# Patient Record
Sex: FEMALE | Race: WHITE | Employment: OTHER | URBAN - METROPOLITAN AREA
[De-identification: names, ages, dates, MRNs, and addresses within clinical notes are randomized per-mention and may not be internally consistent; named-entity substitution may affect disease eponyms.]

---

## 2017-07-21 ENCOUNTER — OFFICE VISIT (OUTPATIENT)
Dept: FAMILY MEDICINE | Facility: CLINIC | Age: 62
End: 2017-07-21
Payer: COMMERCIAL

## 2017-07-21 VITALS
WEIGHT: 120 LBS | DIASTOLIC BLOOD PRESSURE: 81 MMHG | TEMPERATURE: 98.3 F | HEIGHT: 64 IN | OXYGEN SATURATION: 98 % | BODY MASS INDEX: 20.49 KG/M2 | SYSTOLIC BLOOD PRESSURE: 147 MMHG | HEART RATE: 67 BPM

## 2017-07-21 DIAGNOSIS — Z71.89 ADVANCED DIRECTIVES, COUNSELING/DISCUSSION: ICD-10-CM

## 2017-07-21 DIAGNOSIS — N89.8 VAGINAL ITCHING: ICD-10-CM

## 2017-07-21 DIAGNOSIS — N30.01 ACUTE CYSTITIS WITH HEMATURIA: Primary | ICD-10-CM

## 2017-07-21 LAB
ALBUMIN UR-MCNC: NEGATIVE MG/DL
APPEARANCE UR: CLEAR
BACTERIA #/AREA URNS HPF: ABNORMAL /HPF
BILIRUB UR QL STRIP: NEGATIVE
COLOR UR AUTO: YELLOW
GLUCOSE UR STRIP-MCNC: NEGATIVE MG/DL
HGB UR QL STRIP: ABNORMAL
KETONES UR STRIP-MCNC: NEGATIVE MG/DL
LEUKOCYTE ESTERASE UR QL STRIP: ABNORMAL
NITRATE UR QL: POSITIVE
NON-SQ EPI CELLS #/AREA URNS LPF: ABNORMAL /LPF
PH UR STRIP: 5.5 PH (ref 5–7)
RBC #/AREA URNS AUTO: ABNORMAL /HPF (ref 0–2)
SP GR UR STRIP: 1.02 (ref 1–1.03)
URN SPEC COLLECT METH UR: ABNORMAL
UROBILINOGEN UR STRIP-ACNC: 0.2 EU/DL (ref 0.2–1)
WBC #/AREA URNS AUTO: ABNORMAL /HPF (ref 0–2)

## 2017-07-21 PROCEDURE — 81001 URINALYSIS AUTO W/SCOPE: CPT | Performed by: NURSE PRACTITIONER

## 2017-07-21 PROCEDURE — 87088 URINE BACTERIA CULTURE: CPT | Performed by: NURSE PRACTITIONER

## 2017-07-21 PROCEDURE — 87186 SC STD MICRODIL/AGAR DIL: CPT | Performed by: NURSE PRACTITIONER

## 2017-07-21 PROCEDURE — 99213 OFFICE O/P EST LOW 20 MIN: CPT | Performed by: NURSE PRACTITIONER

## 2017-07-21 PROCEDURE — 87086 URINE CULTURE/COLONY COUNT: CPT | Performed by: NURSE PRACTITIONER

## 2017-07-21 RX ORDER — FLUCONAZOLE 150 MG/1
150 TABLET ORAL ONCE
Qty: 1 TABLET | Refills: 0 | Status: SHIPPED | OUTPATIENT
Start: 2017-07-21 | End: 2017-07-21

## 2017-07-21 RX ORDER — SULFAMETHOXAZOLE/TRIMETHOPRIM 800-160 MG
1 TABLET ORAL 2 TIMES DAILY
Qty: 6 TABLET | Refills: 0 | Status: SHIPPED | OUTPATIENT
Start: 2017-07-21 | End: 2017-07-24

## 2017-07-21 NOTE — MR AVS SNAPSHOT
"              After Visit Summary   7/21/2017    Lina Sanford    MRN: [id number]           Patient Information     Date Of Birth          1955        Visit Information        Provider Department      7/21/2017 4:20 PM Regina Amaya NP [facility]        Today's Diagnoses     Urinary symptom or sign    -  1    Advanced directives, counseling/discussion        Nonspecific finding on examination of urine        Acute cystitis with hematuria        Vaginal itching          Care Instructions       * BLADDER INFECTION,Female (Adult)    A bladder infection (\"cystitis\" or \"UTI\") usually causes a constant urge to urinate and a burning when passing urine. Urine may be cloudy, smelly or dark. There may be pain in the lower abdomen. A bladder infection occurs when bacteria from the vaginal area enter the bladder opening (urethra). This can occur from sexual intercourse, wearing tight clothing, dehydration and other factors.  HOME CARE:  1. Drink lots of fluids (at least 6-8 glasses a day, unless you must restrict fluids for other medical reasons). This will force the medicine into your urinary system and flush the bacteria out of your body. Cranberry juice has been shown to help clear out the bacteria.  2. Avoid sexual intercourse until your symptoms are gone.  3. A bladder infection is treated with antibiotics. You may also be given Pyridium (generic = phenazopyridine) to reduce the burning sensation. This medicine will cause your urine to become a bright orange color. The orange urine may stain clothing. You may wear a pad or panty-liner to protect clothing.  PREVENTING FUTURE INFECTIONS:  1. Always wipe from front to back after a bowel movement.  2. Keep the genital area clean and dry.  3. Drink plenty of fluids each day to avoid dehydration.  4. Urinate right after intercourse to flush out the bladder.  5. Wear cotton underwear and cotton-lined panty hose; avoid tight-fitting pants.  6. If you are on " birth control pills and are having frequent bladder infections, discuss with your doctor.  FOLLOW UP: Return to this facility or see your doctor if ALL symptoms are not gone after three days of treatment.  GET PROMPT MEDICAL ATTENTION if any of the following occur:    Fever over 101 F (38.3 C)    No improvement by the third day of treatment    Increasing back or abdominal pain    Repeated vomiting; unable to keep medicine down    Weakness, dizziness or fainting    Vaginal discharge    Pain, redness or swelling in the labia (outer vaginal area)    9359-8896 [employer], [address] [place] [address], [place] [address]. All rights reserved. This information is not intended as a substitute for professional medical care. Always follow your healthcare professional's instructions.    Candida Vaginal Infection    You have a Candida vaginal infection. This is also known as a yeast infection. It is most often caused by a type of yeast (fungus) called Candida. Candida are normally found in the vagina. But if they increase in number, this can lead to infection and cause symptoms.  Symptoms of a yeast infection can include:    Clumpy or thin, white discharge, which may look like cottage cheese    Itching or burning    Burning with urination  Certain factors can make a yeast infection more likely. These can include:    Taking certain medicines, such as antibiotics or birth control pills    Pregnancy    Diabetes    Weakened immune system  A yeast infection is most often treated with antifungal medicine. This may be given as a vaginal cream or pills you take by mouth. Treatment may last for about 1 to 7 days. Women with severe or recurrent infections may need longer courses of treatment.  Home care    If you re prescribed medicine, be sure to use it as directed. Finish all of the medicine, even if your symptoms go away. Note: Don t try to treat yourself using over-the-counter products without talking to your provider first. He  or she will let you know if this is a good option for you.    Ask your provider what steps you can take to help reduce your risk of having a yeast infection in the future.  Follow-up care  Follow up with your healthcare provider, or as directed.  When to seek medical advice  Call your healthcare provider right away if:    You have a fever of 100.4 F (38 C) or higher, or as directed by your provider.    Your symptoms worsen, or they don t go away within a few days of starting treatment.    You have new pain in the lower belly or pelvic region.    You have side effects that bother you or a reaction to the cream or pills you re prescribed.    You or any partners you have sex with have new symptoms, such as a rash, joint pain, or sores.  Date Last Reviewed: 7/30/2015 2000-2017 [employer]. [address] [place] [address]. All rights reserved. This information is not intended as a substitute for professional medical care. Always follow your healthcare professional's instructions.                Follow-ups after your visit        Follow-up notes from your care team     Return if symptoms worsen or fail to improve.      Your next 10 appointments already scheduled     Aug 04, 2017  1:20 PM CDT   PHYSICAL with Amanda Patel,    [facility] ([facility])    [address] [address] [phone]              Who to contact     Normal or non-critical lab and imaging results will be communicated to you by MyChart, letter or phone within 4 business days after the clinic has received the results. If you do not hear from us within 7 days, please contact the clinic through MyChart or phone. If you have a critical or abnormal lab result, we will notify you by phone as soon as possible.  Submit refill requests through [employer] or call your pharmacy and they will forward the refill request to us. Please allow 3 business days for your refill to be  "completed.          If you need to speak with a  for additional information , please call: [phone]             Additional Information About Your Visit        [employer] Information     [employer] gives you secure access to your electronic health record. If you see a primary care provider, you can also send messages to your care team and make appointments. If you have questions, please call your primary care clinic.  If you do not have a primary care provider, please call [phone] and they will assist you.        Care EveryWhere ID     This is your Care EveryWhere ID. This could be used by other organizations to access your [facility] medical records  [id number]        Your Vitals Were     Pulse Temperature Height Last Period Pulse Oximetry BMI (Body Mass Index)    67 98.3  F (36.8  C) (Oral) 5' 4\" (1.626 m) 06/12/2007 98% 20.6 kg/m2       Blood Pressure from Last 3 Encounters:   07/21/17 147/81   06/12/15 122/80   11/22/13 112/74    Weight from Last 3 Encounters:   07/21/17 120 lb (54.4 kg)   06/12/15 115 lb 6.4 oz (52.3 kg)   11/22/13 116 lb 9.6 oz (52.9 kg)              We Performed the Following     UA reflex to Microscopic and Culture     Urine Culture Aerobic Bacterial     Urine Microscopic          Today's Medication Changes          These changes are accurate as of: 7/21/17  4:32 PM.  If you have any questions, ask your nurse or doctor.               Start taking these medicines.        Dose/Directions    fluconazole 150 MG tablet   Commonly known as:  DIFLUCAN   Used for:  Vaginal itching   Started by:  Regina Amaya NP        Dose:  150 mg   Take 1 tablet (150 mg) by mouth once for 1 dose   Quantity:  1 tablet   Refills:  0       sulfamethoxazole-trimethoprim 800-160 MG per tablet   Commonly known as:  BACTRIM DS/SEPTRA DS   Used for:  Acute cystitis with hematuria   Started by:  Regina Amaya NP        Dose:  1 tablet   Take 1 tablet by mouth 2 times daily for 3 days   Quantity:  " 6 tablet   Refills:  0         Stop taking these medicines if you haven't already. Please contact your care team if you have questions.     NO ACTIVE MEDICATIONS   Stopped by:  Regina Amaya, VIET                Where to get your medicines      These medications were sent to [facility] [place] - [address] [facility]  [address] [address] [place] [address]     Phone:  [phone]     fluconazole 150 MG tablet    sulfamethoxazole-trimethoprim 800-160 MG per tablet                Primary Care Provider Office Phone # Fax #    Amanda Wright DO Jorge [phone]       [facility] [address] [place] [facility] [address]        Equal Access to Services     SHRAVAN AYALA : Hadii brandy michelle hadasho Sojuan f, waaxda luqadaha, qaybta kaalmada adeegyada, khadijah joel. So [facility] [phone].    ATENCIÓN: Si habla español, tiene a victoria disposición servicios gratuitos de asistencia lingüística. Llame al [phone].    We comply with applicable federal civil rights laws and [place] laws. We do not discriminate on the basis of race, color, national origin, age, disability sex, sexual orientation or gender identity.            Thank you!     Thank you for choosing [facility]  for your care. Our goal is always to provide you with excellent care. Hearing back from our patients is one way we can continue to improve our services. Please take a few minutes to complete the written survey that you may receive in the mail after your visit with us. Thank you!             Your Updated Medication List - Protect others around you: Learn how to safely use, store and throw away your medicines at www.disposemymeds.org.          This list is accurate as of: 7/21/17  4:32 PM.  Always use your most recent med list.                   Brand Name Dispense Instructions for use Diagnosis    fluconazole 150 MG tablet    DIFLUCAN    1 tablet    Take 1 tablet (150 mg) by mouth once  for 1 dose    Vaginal itching       sulfamethoxazole-trimethoprim 800-160 MG per tablet    BACTRIM DS/SEPTRA DS    6 tablet    Take 1 tablet by mouth 2 times daily for 3 days    Acute cystitis with hematuria

## 2017-07-21 NOTE — PATIENT INSTRUCTIONS
"   * BLADDER INFECTION,Female (Adult)    A bladder infection (\"cystitis\" or \"UTI\") usually causes a constant urge to urinate and a burning when passing urine. Urine may be cloudy, smelly or dark. There may be pain in the lower abdomen. A bladder infection occurs when bacteria from the vaginal area enter the bladder opening (urethra). This can occur from sexual intercourse, wearing tight clothing, dehydration and other factors.  HOME CARE:  1. Drink lots of fluids (at least 6-8 glasses a day, unless you must restrict fluids for other medical reasons). This will force the medicine into your urinary system and flush the bacteria out of your body. Cranberry juice has been shown to help clear out the bacteria.  2. Avoid sexual intercourse until your symptoms are gone.  3. A bladder infection is treated with antibiotics. You may also be given Pyridium (generic = phenazopyridine) to reduce the burning sensation. This medicine will cause your urine to become a bright orange color. The orange urine may stain clothing. You may wear a pad or panty-liner to protect clothing.  PREVENTING FUTURE INFECTIONS:  1. Always wipe from front to back after a bowel movement.  2. Keep the genital area clean and dry.  3. Drink plenty of fluids each day to avoid dehydration.  4. Urinate right after intercourse to flush out the bladder.  5. Wear cotton underwear and cotton-lined panty hose; avoid tight-fitting pants.  6. If you are on birth control pills and are having frequent bladder infections, discuss with your doctor.  FOLLOW UP: Return to this facility or see your doctor if ALL symptoms are not gone after three days of treatment.  GET PROMPT MEDICAL ATTENTION if any of the following occur:    Fever over 101 F (38.3 C)    No improvement by the third day of treatment    Increasing back or abdominal pain    Repeated vomiting; unable to keep medicine down    Weakness, dizziness or fainting    Vaginal discharge    Pain, redness or swelling in " the labia (outer vaginal area)    6340-0487 [employer], [address] [place] [address], [place] [address]. All rights reserved. This information is not intended as a substitute for professional medical care. Always follow your healthcare professional's instructions.    Candida Vaginal Infection    You have a Candida vaginal infection. This is also known as a yeast infection. It is most often caused by a type of yeast (fungus) called Candida. Candida are normally found in the vagina. But if they increase in number, this can lead to infection and cause symptoms.  Symptoms of a yeast infection can include:    Clumpy or thin, white discharge, which may look like cottage cheese    Itching or burning    Burning with urination  Certain factors can make a yeast infection more likely. These can include:    Taking certain medicines, such as antibiotics or birth control pills    Pregnancy    Diabetes    Weakened immune system  A yeast infection is most often treated with antifungal medicine. This may be given as a vaginal cream or pills you take by mouth. Treatment may last for about 1 to 7 days. Women with severe or recurrent infections may need longer courses of treatment.  Home care    If you re prescribed medicine, be sure to use it as directed. Finish all of the medicine, even if your symptoms go away. Note: Don t try to treat yourself using over-the-counter products without talking to your provider first. He or she will let you know if this is a good option for you.    Ask your provider what steps you can take to help reduce your risk of having a yeast infection in the future.  Follow-up care  Follow up with your healthcare provider, or as directed.  When to seek medical advice  Call your healthcare provider right away if:    You have a fever of 100.4 F (38 C) or higher, or as directed by your provider.    Your symptoms worsen, or they don t go away within a few days of starting treatment.    You have new pain in the  lower belly or pelvic region.    You have side effects that bother you or a reaction to the cream or pills you re prescribed.    You or any partners you have sex with have new symptoms, such as a rash, joint pain, or sores.  Date Last Reviewed: 7/30/2015 2000-2017 [employer]. [address] [place] [address], [place] [address]. All rights reserved. This information is not intended as a substitute for professional medical care. Always follow your healthcare professional's instructions.

## 2017-07-21 NOTE — NURSING NOTE
"Chief Complaint   Patient presents with     UTI       Initial /81  Pulse 67  Temp 98.3  F (36.8  C) (Oral)  Ht 5' 4\" (1.626 m)  Wt 120 lb (54.4 kg)  LMP 06/12/2007  SpO2 98%  BMI 20.6 kg/m2 Estimated body mass index is 20.6 kg/(m^2) as calculated from the following:    Height as of this encounter: 5' 4\" (1.626 m).    Weight as of this encounter: 120 lb (54.4 kg).  Medication Reconciliation: complete     Yoanna Amaral MA  "

## 2017-07-21 NOTE — PROGRESS NOTES
SUBJECTIVE:                                                    Lina Sanford is a 61 year old female who presents to clinic today for the following health issues:      Urinary SYMPTOMS     Onset: 2-4 days    Description:   Painful urination (Dysuria): YES  Blood in urine (Hematuria): no   Frequency/Urgency: YES  Abdominal/Pelvic/Flank Pain : YES- pelvic    Other vaginal symptoms: vaginal itching    Progression of Symptoms:  worsening    History:   History of frequent UTI's: no; symptoms similar to prior UTI  History of kidney stones: no   Sexually Active: YES  New Partner: no     Possibility of pregnancy: No     Therapies Tried and outcome: none       Problem list and histories reviewed & adjusted, as indicated.  Additional history: as documented    Patient Active Problem List   Diagnosis     Postmenopausal bleeding     Rotator cuff impingement syndrome     CARDIOVASCULAR SCREENING; LDL GOAL LESS THAN 160     Advanced directives, counseling/discussion     Past Surgical History:   Procedure Laterality Date     C APPENDECTOMY  1963     COLONOSCOPY  1/9/2012    Procedure:COLONOSCOPY; colonoscopy; Surgeon:ANDRIY PETERSON; Location:[facility]     SURGICAL HISTORY OF -   1975    surgical repair of dislocated R middle finger       Social History   Substance Use Topics     Smoking status: Never Smoker     Smokeless tobacco: Never Used     Alcohol use Yes      Comment: occasionally approx 2 drinks per week     Family History   Problem Relation Age of Onset     HEART DISEASE Maternal Grandmother      pacemaker     Thyroid Disease Maternal Grandmother      goiter     C.A.D. Maternal Grandmother      C.A.D. Paternal Grandmother      C.A.D. Paternal Grandfather      Hypertension Paternal Grandfather      CEREBROVASCULAR DISEASE Paternal Grandfather      C.A.D. Sister      at age 50, no MI but strain     DIABETES No family hx of      Breast Cancer No family hx of      Cancer - colorectal No family hx of          Current  "Outpatient Prescriptions   Medication Sig Dispense Refill     sulfamethoxazole-trimethoprim (BACTRIM DS/SEPTRA DS) 800-160 MG per tablet Take 1 tablet by mouth 2 times daily for 3 days 6 tablet 0     fluconazole (DIFLUCAN) 150 MG tablet Take 1 tablet (150 mg) by mouth once for 1 dose 1 tablet 0     Allergies   Allergen Reactions     Nkda [No Known Drug Allergies]      BP Readings from Last 3 Encounters:   07/21/17 147/81   06/12/15 122/80   11/22/13 112/74    Wt Readings from Last 3 Encounters:   07/21/17 120 lb (54.4 kg)   06/12/15 115 lb 6.4 oz (52.3 kg)   11/22/13 116 lb 9.6 oz (52.9 kg)            Labs reviewed in EPIC    Reviewed and updated as needed this visit by clinical staff  Tobacco  Allergies  Meds  Med Hx  Surg Hx  Fam Hx  Soc Hx      Reviewed and updated as needed this visit by Provider         ROS:  Constitutional, HEENT, cardiovascular, pulmonary, GI, , musculoskeletal, neuro, skin, endocrine and psych systems are negative, except as otherwise noted.      OBJECTIVE:   /81  Pulse 67  Temp 98.3  F (36.8  C) (Oral)  Ht 5' 4\" (1.626 m)  Wt 120 lb (54.4 kg)  LMP 06/12/2007  SpO2 98%  BMI 20.6 kg/m2  Body mass index is 20.6 kg/(m^2).  GENERAL: healthy, alert and no distress  RESP: lungs clear to auscultation - no rales, rhonchi or wheezes  CV: regular rate and rhythm, normal S1 S2, no S3 or S4, no murmur, click or rub, no peripheral edema and peripheral pulses strong  ABDOMEN: soft, nontender, no hepatosplenomegaly, no masses and bowel sounds normal  MS: no gross musculoskeletal defects noted, no edema, NO CVA tenderness  SKIN: no suspicious lesions or rashes  PSYCH: mentation appears normal, affect normal/bright    Diagnostic Test Results:  See orders    ASSESSMENT/PLAN:         ICD-10-CM    1. Acute cystitis with hematuria N30.01 UA reflex to Microscopic and Culture     Urine Microscopic     Urine Culture Aerobic Bacterial     sulfamethoxazole-trimethoprim (BACTRIM DS/SEPTRA DS) " 800-160 MG per tablet   2. Advanced directives, counseling/discussion Z71.89    3. Vaginal itching L29.8 fluconazole (DIFLUCAN) 150 MG tablet    suspected yeast infection based on symptoms       See Patient Instructions    Regina Amaya, HEMAL  [facility]

## 2017-07-24 LAB
BACTERIA SPEC CULT: ABNORMAL
MICRO REPORT STATUS: ABNORMAL
MICROORGANISM SPEC CULT: ABNORMAL
SPECIMEN SOURCE: ABNORMAL

## 2017-07-24 RX ORDER — CIPROFLOXACIN 250 MG/1
250 TABLET, FILM COATED ORAL 2 TIMES DAILY
Qty: 6 TABLET | Refills: 0 | Status: SHIPPED | OUTPATIENT
Start: 2017-07-24 | End: 2017-08-04

## 2017-07-24 NOTE — PROGRESS NOTES
Chin Mills,    Thank you for your recent office visit.    Here are your recent results.  Urine culture shows that we should change your antibiotic at this time. Cipro was sent in for you. Please take full course of antibiotics and follow up if symptoms persist or worsen.     Feel free to contact me via [email or web address] or call the clinic at [phone].    Sincerely,    UVALDO Neville, FNP-BC

## 2017-08-04 ENCOUNTER — OFFICE VISIT (OUTPATIENT)
Dept: FAMILY MEDICINE | Facility: CLINIC | Age: 62
End: 2017-08-04
Payer: COMMERCIAL

## 2017-08-04 VITALS
SYSTOLIC BLOOD PRESSURE: 130 MMHG | HEIGHT: 64 IN | HEART RATE: 60 BPM | WEIGHT: 118.6 LBS | BODY MASS INDEX: 20.25 KG/M2 | DIASTOLIC BLOOD PRESSURE: 80 MMHG | TEMPERATURE: 97.5 F

## 2017-08-04 DIAGNOSIS — Z01.419 ENCOUNTER FOR GYNECOLOGICAL EXAMINATION WITHOUT ABNORMAL FINDING: Primary | ICD-10-CM

## 2017-08-04 DIAGNOSIS — Z12.4 SCREENING FOR CERVICAL CANCER: ICD-10-CM

## 2017-08-04 DIAGNOSIS — Z12.31 ENCOUNTER FOR SCREENING MAMMOGRAM FOR BREAST CANCER: ICD-10-CM

## 2017-08-04 DIAGNOSIS — Z11.59 NEED FOR HEPATITIS C SCREENING TEST: ICD-10-CM

## 2017-08-04 DIAGNOSIS — Z13.6 CARDIOVASCULAR SCREENING; LDL GOAL LESS THAN 160: ICD-10-CM

## 2017-08-04 PROCEDURE — 36415 COLL VENOUS BLD VENIPUNCTURE: CPT | Performed by: FAMILY MEDICINE

## 2017-08-04 PROCEDURE — 87624 HPV HI-RISK TYP POOLED RSLT: CPT | Performed by: FAMILY MEDICINE

## 2017-08-04 PROCEDURE — 80061 LIPID PANEL: CPT | Performed by: FAMILY MEDICINE

## 2017-08-04 PROCEDURE — 86803 HEPATITIS C AB TEST: CPT | Performed by: FAMILY MEDICINE

## 2017-08-04 PROCEDURE — G0145 SCR C/V CYTO,THINLAYER,RESCR: HCPCS | Performed by: FAMILY MEDICINE

## 2017-08-04 PROCEDURE — 99396 PREV VISIT EST AGE 40-64: CPT | Performed by: FAMILY MEDICINE

## 2017-08-04 NOTE — LETTER
Lina Sanford  [address] [address]        August 9, 2017          Dear ,    We are writing to inform you of your test results.    Your cholesterol looks good, very stable from last check.  We should continue to check this every few years.    Your hepatitis C screening was negative.      Resulted Orders   Lipid panel reflex to direct LDL   Result Value Ref Range    Cholesterol 235 (H) <200 mg/dL      Comment:      Desirable:       <200 mg/dl    Triglycerides 99 <150 mg/dL    HDL Cholesterol 82 >49 mg/dL    LDL Cholesterol Calculated 133 (H) <100 mg/dL      Comment:      Above desirable:  100-129 mg/dl   Borderline High:  130-159 mg/dL   High:             160-189 mg/dL   Very high:       >189 mg/dl      Non HDL Cholesterol 153 (H) <130 mg/dL      Comment:      Above Desirable:  130-159 mg/dl   Borderline high:  160-189 mg/dl   High:             190-219 mg/dl   Very high:       >219 mg/dl     Hepatitis C Screen Reflex to HCV RNA Quant and Genotype   Result Value Ref Range    Hepatitis C Antibody  NR     Nonreactive   Assay performance characteristics have not been established for newborns,   infants, and children         If you have any questions or concerns, please call the clinic at the number listed above.       Sincerely,        Amanda Patel DO/ag

## 2017-08-04 NOTE — NURSING NOTE
"Initial /80  Pulse 60  Temp 97.5  F (36.4  C) (Tympanic)  Ht 5' 4\" (1.626 m)  Wt 118 lb 9.6 oz (53.8 kg)  LMP 06/12/2007  Breastfeeding? No  BMI 20.36 kg/m2 Estimated body mass index is 20.36 kg/(m^2) as calculated from the following:    Height as of this encounter: 5' 4\" (1.626 m).    Weight as of this encounter: 118 lb 9.6 oz (53.8 kg). .      "

## 2017-08-04 NOTE — PATIENT INSTRUCTIONS
I'd recommend aiming for 1200-1500mg of calcium form all sources daily as well as 1000 units of vitamin D.    You can call [phone] to schedule your mammogram.    We'll let you know your results when available.    Preventive Health Recommendations  Female Ages 50 - 64    Yearly exam: See your health care provider every year in order to  o Review health changes.   o Discuss preventive care.    o Review your medicines if your doctor has prescribed any.      Get a Pap test every three years (unless you have an abnormal result and your provider advises testing more often).    If you get Pap tests with HPV test, you only need to test every 5 years, unless you have an abnormal result.     You do not need a Pap test if your uterus was removed (hysterectomy) and you have not had cancer.    You should be tested each year for STDs (sexually transmitted diseases) if you're at risk.     Have a mammogram every 1 to 2 years.    Have a colonoscopy at age 50, or have a yearly FIT test (stool test). These exams screen for colon cancer.      Have a cholesterol test every 5 years, or more often if advised.    Have a diabetes test (fasting glucose) every three years. If you are at risk for diabetes, you should have this test more often.     If you are at risk for osteoporosis (brittle bone disease), think about having a bone density scan (DEXA).    Shots: Get a flu shot each year. Get a tetanus shot every 10 years.    Nutrition:     Eat at least 5 servings of fruits and vegetables each day.    Eat whole-grain bread, whole-wheat pasta and brown rice instead of white grains and rice.    Talk to your provider about Calcium and Vitamin D.     Lifestyle    Exercise at least 150 minutes a week (30 minutes a day, 5 days a week). This will help you control your weight and prevent disease.    Limit alcohol to one drink per day.    No smoking.     Wear sunscreen to prevent skin cancer.     See your dentist every six months for an exam and  cleaning.    See your eye doctor every 1 to 2 years.

## 2017-08-04 NOTE — PROGRESS NOTES
SUBJECTIVE:   CC: Lina Sanford is an 61 year old woman who presents for preventive health visit.     Answers for HPI/ROS submitted by the patient on 8/4/2017   Annual Exam:  Getting at least 3 servings of Calcium per day:: Yes  Bi-annual eye exam:: Yes  Dental care twice a year:: Yes  Sleep apnea or symptoms of sleep apnea:: None  Diet:: Regular (no restrictions)  Frequency of exercise:: 4-5 days/week  Taking medications regularly:: Not Applicable  Medication side effects:: None  Additional concerns today:: YES  PHQ-2 Score: 0  Duration of exercise:: 45-60 minutes    Concerns:  * blood pressure has been elevated    Today's PHQ-2 Score:   PHQ-2 ( 1999 Pf[employer]) 8/4/2017 11/22/2013   Q1: Little interest or pleasure in doing things 0 0   Q2: Feeling down, depressed or hopeless 0 0   PHQ-2 Score 0 0   Q1: Little interest or pleasure in doing things Not at all -   Q2: Feeling down, depressed or hopeless Not at all -   PHQ-2 Score 0 -       Abuse: Current or Past(Physical, Sexual or Emotional)- No  Do you feel safe in your environment - Yes    Social History   Substance Use Topics     Smoking status: Never Smoker     Smokeless tobacco: Never Used     Alcohol use Yes      Comment: occasionally approx 2 drinks per week     The patient does not drink >3 drinks per day nor >7 drinks per week.    Reviewed orders with patient.  Reviewed health maintenance and updated orders accordingly - Yes    Patient over age 50, mutual decision to screen reflected in health maintenance.      Pertinent mammograms are reviewed under the imaging tab.  History of abnormal Pap smear: NO - age 30- 65 PAP every 3 years recommended    Reviewed and updated as needed this visit by clinical staff  Tobacco  Allergies  Meds  Med Hx  Surg Hx  Fam Hx  Soc Hx        Reviewed and updated as needed this visit by Provider  Tobacco  Med Hx  Surg Hx  Fam Hx  Soc Hx       History reviewed. No pertinent past medical history.   Past Surgical History:  "  Procedure Laterality Date     C APPENDECTOMY  1963     COLONOSCOPY  1/9/2012    Procedure:COLONOSCOPY; colonoscopy; Surgeon:ANDRIY PETERSON; Location:[facility]     SURGICAL HISTORY OF -   1975    surgical repair of dislocated R middle finger       ROS:  C: NEGATIVE for fever, chills, change in weight  I: NEGATIVE for worrisome rashes, moles or lesions  E: NEGATIVE for vision changes or irritation  ENT: NEGATIVE for ear, mouth and throat problems  R: NEGATIVE for significant cough or SOB  B: NEGATIVE for masses, tenderness or discharge  CV: NEGATIVE for chest pain, palpitations or peripheral edema  GI: NEGATIVE for nausea, abdominal pain, heartburn, or change in bowel habits  : NEGATIVE for unusual urinary or vaginal symptoms. No vaginal bleeding.  M: NEGATIVE for significant arthralgias or myalgia  N: NEGATIVE for weakness, dizziness or paresthesias  P: NEGATIVE for changes in mood or affect     OBJECTIVE:   /80  Pulse 60  Temp 97.5  F (36.4  C) (Tympanic)  Ht 5' 4\" (1.626 m)  Wt 118 lb 9.6 oz (53.8 kg)  LMP 06/12/2007  Breastfeeding? No  BMI 20.36 kg/m2  EXAM:  GENERAL APPEARANCE: healthy, alert and no distress  EYES: Eyes grossly normal to inspection, PERRL and conjunctivae and sclerae normal  HENT: ear canals and TM's normal, nose and mouth without ulcers or lesions, oropharynx clear and oral mucous membranes moist  NECK: no adenopathy, no asymmetry, masses, or scars and thyroid normal to palpation  RESP: lungs clear to auscultation - no rales, rhonchi or wheezes  BREAST: normal without masses, tenderness or nipple discharge and no palpable axillary masses or adenopathy  CV: regular rate and rhythm, normal S1 S2, no S3 or S4, no murmur, click or rub, no peripheral edema and peripheral pulses strong  ABDOMEN: soft, nontender, no hepatosplenomegaly, no masses and bowel sounds normal   (female): normal female external genitalia, normal urethral meatus, vaginal mucosal atrophy noted, normal " "cervix, adnexae, and uterus without masses or abnormal discharge  MS: no musculoskeletal defects are noted and gait is age appropriate without ataxia  NEURO: Normal strength and tone, sensory exam grossly normal, mentation intact and speech normal  PSYCH: mentation appears normal and affect normal/bright    ASSESSMENT/PLAN:       ICD-10-CM    1. Encounter for gynecological examination without abnormal finding Z01.419    2. Need for hepatitis C screening test Z11.59 Hepatitis C Screen Reflex to HCV RNA Quant and Genotype   3. CARDIOVASCULAR SCREENING; LDL GOAL LESS THAN 160 Z13.6 Lipid panel reflex to direct LDL   4. Encounter for screening mammogram for breast cancer Z12.31 MA Screening Digital Bilateral   5. Screening for cervical cancer Z12.4 Pap imaged thin layer screen with HPV - recommended age 30 - 65 years (select HPV order below)     HPV High Risk Types DNA Cervical       COUNSELING:   Reviewed preventive health counseling, as reflected in patient instructions  Special attention given to:        Regular exercise       Healthy diet/nutrition       Osteoporosis Prevention/Bone Health       Colon cancer screening       Consider Hep C screening for patients born between 1945 and 1965       (Fanny)menopause management      BP Screening:   Last 3 BP Readings:    BP Readings from Last 3 Encounters:   08/04/17 130/80   07/21/17 147/81   06/12/15 122/80       The following was recommended to the patient:  Re-screen BP within a year and recommended lifestyle modifications   reports that she has never smoked. She has never used smokeless tobacco.    Estimated body mass index is 20.36 kg/(m^2) as calculated from the following:    Height as of this encounter: 5' 4\" (1.626 m).    Weight as of this encounter: 118 lb 9.6 oz (53.8 kg).         Counseling Resources:  ATP IV Guidelines  Pooled Cohorts Equation Calculator  Breast Cancer Risk Calculator  FRAX Risk Assessment  ICSI Preventive Guidelines  Dietary Guidelines for " Americans, 2010  USDA's MyPlate  ASA Prophylaxis  Lung CA Screening    Amanda Patel, DO  [facility]  Patient Instructions   I'd recommend aiming for 1200-1500mg of calcium form all sources daily as well as 1000 units of vitamin D.    You can call [phone] to schedule your mammogram.    We'll let you know your results when available.    Preventive Health Recommendations  Female Ages 50 - 64    Yearly exam: See your health care provider every year in order to  o Review health changes.   o Discuss preventive care.    o Review your medicines if your doctor has prescribed any.      Get a Pap test every three years (unless you have an abnormal result and your provider advises testing more often).    If you get Pap tests with HPV test, you only need to test every 5 years, unless you have an abnormal result.     You do not need a Pap test if your uterus was removed (hysterectomy) and you have not had cancer.    You should be tested each year for STDs (sexually transmitted diseases) if you're at risk.     Have a mammogram every 1 to 2 years.    Have a colonoscopy at age 50, or have a yearly FIT test (stool test). These exams screen for colon cancer.      Have a cholesterol test every 5 years, or more often if advised.    Have a diabetes test (fasting glucose) every three years. If you are at risk for diabetes, you should have this test more often.     If you are at risk for osteoporosis (brittle bone disease), think about having a bone density scan (DEXA).    Shots: Get a flu shot each year. Get a tetanus shot every 10 years.    Nutrition:     Eat at least 5 servings of fruits and vegetables each day.    Eat whole-grain bread, whole-wheat pasta and brown rice instead of white grains and rice.    Talk to your provider about Calcium and Vitamin D.     Lifestyle    Exercise at least 150 minutes a week (30 minutes a day, 5 days a week). This will help you control your weight and prevent disease.    Limit  alcohol to one drink per day.    No smoking.     Wear sunscreen to prevent skin cancer.     See your dentist every six months for an exam and cleaning.    See your eye doctor every 1 to 2 years.

## 2017-08-04 NOTE — MR AVS SNAPSHOT
After Visit Summary   8/4/2017    Lina Sanford    MRN: [id number]           Patient Information     Date Of Birth          1955        Visit Information        Provider Department      8/4/2017 1:20 PM Amanda Patel DO [facility]        Today's Diagnoses     Encounter for gynecological examination without abnormal finding    -  1    Need for hepatitis C screening test        CARDIOVASCULAR SCREENING; LDL GOAL LESS THAN 160        Encounter for screening mammogram for breast cancer        Screening for cervical cancer          Care Instructions    I'd recommend aiming for 1200-1500mg of calcium form all sources daily as well as 1000 units of vitamin D.    You can call [phone] to schedule your mammogram.    We'll let you know your results when available.    Preventive Health Recommendations  Female Ages 50 - 64    Yearly exam: See your health care provider every year in order to  o Review health changes.   o Discuss preventive care.    o Review your medicines if your doctor has prescribed any.      Get a Pap test every three years (unless you have an abnormal result and your provider advises testing more often).    If you get Pap tests with HPV test, you only need to test every 5 years, unless you have an abnormal result.     You do not need a Pap test if your uterus was removed (hysterectomy) and you have not had cancer.    You should be tested each year for STDs (sexually transmitted diseases) if you're at risk.     Have a mammogram every 1 to 2 years.    Have a colonoscopy at age 50, or have a yearly FIT test (stool test). These exams screen for colon cancer.      Have a cholesterol test every 5 years, or more often if advised.    Have a diabetes test (fasting glucose) every three years. If you are at risk for diabetes, you should have this test more often.     If you are at risk for osteoporosis (brittle bone disease), think about having a bone density scan  (DEXA).    Shots: Get a flu shot each year. Get a tetanus shot every 10 years.    Nutrition:     Eat at least 5 servings of fruits and vegetables each day.    Eat whole-grain bread, whole-wheat pasta and brown rice instead of white grains and rice.    Talk to your provider about Calcium and Vitamin D.     Lifestyle    Exercise at least 150 minutes a week (30 minutes a day, 5 days a week). This will help you control your weight and prevent disease.    Limit alcohol to one drink per day.    No smoking.     Wear sunscreen to prevent skin cancer.     See your dentist every six months for an exam and cleaning.    See your eye doctor every 1 to 2 years.            Follow-ups after your visit        Future tests that were ordered for you today     Open Future Orders        Priority Expected Expires Ordered    MA Screening Digital Bilateral Routine  8/4/2018 8/4/2017            Who to contact     Normal or non-critical lab and imaging results will be communicated to you by MyWantshart, letter or phone within 4 business days after the clinic has received the results. If you do not hear from us within 7 days, please contact the clinic through Yugmat or phone. If you have a critical or abnormal lab result, we will notify you by phone as soon as possible.  Submit refill requests through PEMRED or call your pharmacy and they will forward the refill request to us. Please allow 3 business days for your refill to be completed.          If you need to speak with a  for additional information , please call: [phone]           Additional Information About Your Visit        PEMRED Information     PEMRED gives you secure access to your electronic health record. If you see a primary care provider, you can also send messages to your care team and make appointments. If you have questions, please call your primary care clinic.  If you do not have a primary care provider, please call [phone] and they will assist  "you.        Care EveryWhere ID     This is your Care EveryWhere ID. This could be used by other organizations to access your [facility] medical records  [id number]        Your Vitals Were     Pulse Temperature Height Last Period Breastfeeding? BMI (Body Mass Index)    60 97.5  F (36.4  C) (Tympanic) 5' 4\" (1.626 m) 06/12/2007 No 20.36 kg/m2       Blood Pressure from Last 3 Encounters:   08/04/17 130/80   07/21/17 147/81   06/12/15 122/80    Weight from Last 3 Encounters:   08/04/17 118 lb 9.6 oz (53.8 kg)   07/21/17 120 lb (54.4 kg)   06/12/15 115 lb 6.4 oz (52.3 kg)              We Performed the Following     Hepatitis C Screen Reflex to HCV RNA Quant and Genotype     HPV High Risk Types DNA Cervical     Lipid panel reflex to direct LDL     Pap imaged thin layer screen with HPV - recommended age 30 - 65 years (select HPV order below)        Primary Care Provider Office Phone # Fax #    Amanda Adriana Patel [phone]       [facility] [address] [place] [address]        Equal Access to Services     ANA AYALA AH: Hadii brandy sawyer Sojuan f, waaxda luqadaha, qaybta kaalmada adevanyada, khadijah joel. So [facility] [phone].    ATENCIÓN: Si habla español, tiene a victorai disposición servicios gratuitos de asistencia lingüística. LlSelect Medical [facility] [phone].    We comply with applicable federal civil rights laws and [place] laws. We do not discriminate on the basis of race, color, national origin, age, disability sex, sexual orientation or gender identity.            Thank you!     Thank you for choosing [facility]  for your care. Our goal is always to provide you with excellent care. Hearing back from our patients is one way we can continue to improve our services. Please take a few minutes to complete the written survey that you may receive in the mail after your visit with us. Thank you!             Your Updated Medication List - Protect others " around you: Learn how to safely use, store and throw away your medicines at www.disposemymeds.org.      Notice  As of 8/4/2017  2:14 PM    You have not been prescribed any medications.

## 2017-08-05 LAB
CHOLEST SERPL-MCNC: 235 MG/DL
HDLC SERPL-MCNC: 82 MG/DL
LDLC SERPL CALC-MCNC: 133 MG/DL
NONHDLC SERPL-MCNC: 153 MG/DL
TRIGL SERPL-MCNC: 99 MG/DL

## 2017-08-07 LAB — HCV AB SERPL QL IA: NORMAL

## 2017-08-09 LAB
COPATH REPORT: NORMAL
PAP: NORMAL

## 2017-08-10 LAB
FINAL DIAGNOSIS: NORMAL
HPV HR 12 DNA CVX QL NAA+PROBE: NEGATIVE
HPV16 DNA SPEC QL NAA+PROBE: NEGATIVE
HPV18 DNA SPEC QL NAA+PROBE: NEGATIVE
SPECIMEN DESCRIPTION: NORMAL

## 2017-11-10 ENCOUNTER — OFFICE VISIT (OUTPATIENT)
Dept: FAMILY MEDICINE | Facility: CLINIC | Age: 62
End: 2017-11-10
Payer: COMMERCIAL

## 2017-11-10 VITALS
BODY MASS INDEX: 21.1 KG/M2 | DIASTOLIC BLOOD PRESSURE: 80 MMHG | TEMPERATURE: 98.1 F | HEIGHT: 64 IN | SYSTOLIC BLOOD PRESSURE: 124 MMHG | WEIGHT: 123.6 LBS | HEART RATE: 64 BPM

## 2017-11-10 DIAGNOSIS — B02.9 HERPES ZOSTER WITHOUT COMPLICATION: Primary | ICD-10-CM

## 2017-11-10 PROCEDURE — 99213 OFFICE O/P EST LOW 20 MIN: CPT | Performed by: FAMILY MEDICINE

## 2017-11-10 RX ORDER — GABAPENTIN 300 MG/1
CAPSULE ORAL
Qty: 30 CAPSULE | Refills: 0 | Status: SHIPPED | OUTPATIENT
Start: 2017-11-10 | End: 2020-07-30

## 2017-11-10 RX ORDER — VALACYCLOVIR HYDROCHLORIDE 1 G/1
1000 TABLET, FILM COATED ORAL 3 TIMES DAILY
Qty: 21 TABLET | Refills: 0 | Status: SHIPPED | OUTPATIENT
Start: 2017-11-10 | End: 2020-07-30

## 2017-11-10 NOTE — NURSING NOTE
"Initial /80  Pulse 64  Temp 98.1  F (36.7  C) (Tympanic)  Ht 5' 4.25\" (1.632 m)  Wt 123 lb 9.6 oz (56.1 kg)  LMP 06/12/2007  Breastfeeding? No  BMI 21.05 kg/m2 Estimated body mass index is 21.05 kg/(m^2) as calculated from the following:    Height as of this encounter: 5' 4.25\" (1.632 m).    Weight as of this encounter: 123 lb 9.6 oz (56.1 kg). .      "

## 2017-11-10 NOTE — MR AVS SNAPSHOT
After Visit Summary   11/10/2017    Lina Sanford    MRN: [id number]           Patient Information     Date Of Birth          1955        Visit Information        Provider Department      11/10/2017 9:00 AM Amanda Patel DO [facility]        Today's Diagnoses     Herpes zoster without complication    -  1      Care Instructions    I suspect this is shingles for you.  Because of the location I did send a prescription for the antiviral medication for you.  Please make sure to finish the course.    I also printed out a prescription for gabapentin which is a nerve pain medication.  You can begin this if needed and follow instructions for increasing the dose.  This medication can cause drowsiness as the most common side effects so use cautiously.  Tylenol/ibuprofen can also be used as needed for pain.    We scheduled you an appointment with the eye doctor today to make sure there is no eye involvement.  It is very important you be seen before the weekend.    [facility]  [address]  [place] [address]  [phone]            Follow-ups after your visit        Additional Services     OPTOMETRY REFERRAL       Your provider has referred you to: [facility]: [facility] [place] [phone]   [email or web address]  [place] [phone]   [email or web address]    Please be aware that coverage of these services is subject to the terms and limitations of your health insurance plan.  Call member services at your health plan with any benefit or coverage questions.      Please bring the following with you to your appointment:    (1) Any X-Rays, CTs or MRIs which have been performed.  Contact the facility where they were done to arrange for  prior to your scheduled appointment.    (2) List of current medications  (3) This referral request   (4) Any documents/labs given to you for this referral                  Who to contact     Normal  "or non-critical lab and imaging results will be communicated to you by MyChart, letter or phone within 4 business days after the clinic has received the results. If you do not hear from us within 7 days, please contact the clinic through Flexuspinet or phone. If you have a critical or abnormal lab result, we will notify you by phone as soon as possible.  Submit refill requests through [email or web address] or call your pharmacy and they will forward the refill request to us. Please allow 3 business days for your refill to be completed.          If you need to speak with a  for additional information , please call: [phone]           Additional Information About Your Visit        [email or web address] Information     [email or web address] gives you secure access to your electronic health record. If you see a primary care provider, you can also send messages to your care team and make appointments. If you have questions, please call your primary care clinic.  If you do not have a primary care provider, please call [phone] and they will assist you.        Care EveryWhere ID     This is your Care EveryWhere ID. This could be used by other organizations to access your [facility] medical records  [id number]        Your Vitals Were     Pulse Temperature Height Last Period Breastfeeding? BMI (Body Mass Index)    64 98.1  F (36.7  C) (Tympanic) 5' 4.25\" (1.632 m) 06/12/2007 No 21.05 kg/m2       Blood Pressure from Last 3 Encounters:   11/10/17 124/80   08/04/17 130/80   07/21/17 147/81    Weight from Last 3 Encounters:   11/10/17 123 lb 9.6 oz (56.1 kg)   08/04/17 118 lb 9.6 oz (53.8 kg)   07/21/17 120 lb (54.4 kg)              We Performed the Following     OPTOMETRY REFERRAL          Today's Medication Changes          These changes are accurate as of: 11/10/17  9:59 AM.  If you have any questions, ask your nurse or doctor.               Start taking these medicines.        Dose/Directions    gabapentin 300 MG capsule   Commonly known as:  " NEURONTIN   Used for:  Herpes zoster without complication   Started by:  Amanda Patel DO        Take 1 tablet (300 mg) every night for 3 days, then 1 tablet twice daily for 3 days, then 1 tablet three times daily   Quantity:  30 capsule   Refills:  0       valACYclovir 1000 mg tablet   Commonly known as:  VALTREX   Used for:  Herpes zoster without complication   Started by:  Amanda Patel DO        Dose:  1000 mg   Take 1 tablet (1,000 mg) by mouth 3 times daily   Quantity:  21 tablet   Refills:  0            Where to get your medicines      These medications were sent to [facility] [address]  [address] [address]     Phone:  [phone]     valACYclovir 1000 mg tablet         Some of these will need a paper prescription and others can be bought over the counter.  Ask your nurse if you have questions.     Bring a paper prescription for each of these medications     gabapentin 300 MG capsule                Primary Care Provider Office Phone # Fax #    Amanda Patel [phone] [address]   [place] [address]        Equal Access to Services     [facility] CenterJUAN DANIEL : Hadii aad ku hadasho Sojuan f, waaxda luqadaha, qaybta kaalmada adeegyaliu, khadijah joel. So [facility] [phone].    ATENCIÓN: Si habla español, tiene a victoria disposición servicios gratuitos de asistencia lingüística. Llame al [phone].    We comply with applicable federal civil rights laws and [place] laws. We do not discriminate on the basis of race, color, national origin, age, disability, sex, sexual orientation, or gender identity.            Thank you!     Thank you for choosing [facility]  for your care. Our goal is always to provide you with excellent care. Hearing back from our patients is one way we can continue to improve our services. Please take a few minutes to complete the written survey that you  may receive in the mail after your visit with us. Thank you!             Your Updated Medication List - Protect others around you: Learn how to safely use, store and throw away your medicines at www.disposemymeds.org.          This list is accurate as of: 11/10/17  9:59 AM.  Always use your most recent med list.                   Brand Name Dispense Instructions for use Diagnosis    gabapentin 300 MG capsule    NEURONTIN    30 capsule    Take 1 tablet (300 mg) every night for 3 days, then 1 tablet twice daily for 3 days, then 1 tablet three times daily    Herpes zoster without complication       valACYclovir 1000 mg tablet    VALTREX    21 tablet    Take 1 tablet (1,000 mg) by mouth 3 times daily    Herpes zoster without complication

## 2017-11-10 NOTE — PATIENT INSTRUCTIONS
I suspect this is shingles for you.  Because of the location I did send a prescription for the antiviral medication for you.  Please make sure to finish the course.    I also printed out a prescription for gabapentin which is a nerve pain medication.  You can begin this if needed and follow instructions for increasing the dose.  This medication can cause drowsiness as the most common side effects so use cautiously.  Tylenol/ibuprofen can also be used as needed for pain.    We scheduled you an appointment with the eye doctor today to make sure there is no eye involvement.  It is very important you be seen before the weekend.    [facility]  [address], [address]  [place], [place] [id number] [phone]

## 2017-11-10 NOTE — PROGRESS NOTES
"  SUBJECTIVE:   Lina Sanford is a 62 year old female who presents to clinic today for the following health issues:    Rash      Duration: 2 weeks    Description  Location: above left eye  Itching: moderate    Intensity:  mild    Accompanying signs and symptoms: None    History (similar episodes/previous evaluation): has had poison ivy in the past, seems similar     Precipitating or alleviating factors:  New exposures:  None known but she does have poison ivy in her yard  Recent travel: no      Therapies tried and outcome: hydrocortisone cream -  not effective and gold bond is temporarily effective      Errols rash began about 12 days ago. The rash is located at the front end of her left eyebrow, and it appeared like many little blisters. The blisters have since crusted. The rash is not getting worse but is not getting better. She has associated itch in left upper scalp, left forehead, left eyebrow, and in the last few days has felt itchiness around her left eye. She reports its become slightly painful under her left eye. She has tried hydrocortisone cream and menthol cream which provided only short lived relief.   She thought this was poison ivy since she has had poison ivy problems in her yard in the past. However, this time she is experiencing itch where there is no rash. She denies tingling, numbness, or pain. She denies vision changes. She has had chickenpox before.       Problem list and histories reviewed & adjusted, as indicated.  Additional history: as documented    Reviewed and updated as needed this visit by clinical staffTobacco  Allergies  Meds  Med Hx  Surg Hx  Fam Hx  Soc Hx      Reviewed and updated as needed this visit by Provider  Tobacco  Med Hx  Surg Hx  Fam Hx  Soc Hx        ROS:  Constitutional, HEENT, cardiovascular, pulmonary, gi and gu systems are negative, except as otherwise noted.      OBJECTIVE:   /80  Pulse 64  Temp 98.1  F (36.7  C) (Tympanic)  Ht 5' 4.25\" (1.632 " m)  Wt 123 lb 9.6 oz (56.1 kg)  LMP 06/12/2007  Breastfeeding? No  BMI 21.05 kg/m2  Body mass index is 21.05 kg/(m^2).  GENERAL: healthy, alert and no distress  NECK: no adenopathy, no asymmetry, masses, or scars and thyroid normal to palpation  RESP: lungs clear to auscultation - no rales, rhonchi or wheezes  CV: regular rate and rhythm, normal S1 S2, no S3 or S4, no murmur, click or rub, no peripheral edema and peripheral pulses strong  MS: no gross musculoskeletal defects noted, no edema  SKIN: Erythematous vesicular lesion in the front end of her left eyebrow which has crusted. Left scalp appears irritated but without a vesicular rash.    ASSESSMENT/PLAN:     Lina is a 61 yo woman who presents with vesicular rash and itchiness along her left upper face and scalp. This is unlikely to be poison ivy and most likely to be Shingles due to the dermatomal distribution as well as itch where there is no rash. Due to concern of proximity to eye, suggest optometry referral to see today.       ICD-10-CM    1. Herpes zoster without complication B02.9 OPTOMETRY REFERRAL     valACYclovir (VALTREX) 1000 mg tablet     gabapentin (NEURONTIN) 300 MG capsule     Patient Instructions   I suspect this is shingles for you.  Because of the location I did send a prescription for the antiviral medication for you.  Please make sure to finish the course.    I also printed out a prescription for gabapentin which is a nerve pain medication.  You can begin this if needed and follow instructions for increasing the dose.  This medication can cause drowsiness as the most common side effects so use cautiously.  Tylenol/ibuprofen can also be used as needed for pain.    We scheduled you an appointment with the eye doctor today to make sure there is no eye involvement.  It is very important you be seen before the weekend.    [facility]  [address], [address]  [place] [address] [phone]          Amanda Patel,  DO  [facility] [address]

## 2018-03-02 ENCOUNTER — HOSPITAL ENCOUNTER (EMERGENCY)
Facility: CLINIC | Age: 63
Discharge: HOME | End: 2018-03-02
Attending: FAMILY MEDICINE | Admitting: FAMILY MEDICINE
Payer: COMMERCIAL

## 2018-03-02 ENCOUNTER — APPOINTMENT (OUTPATIENT)
Dept: CT IMAGING | Facility: CLINIC | Age: 63
End: 2018-03-02
Attending: FAMILY MEDICINE
Payer: COMMERCIAL

## 2018-03-02 VITALS
OXYGEN SATURATION: 97 % | HEIGHT: 64 IN | SYSTOLIC BLOOD PRESSURE: 132 MMHG | WEIGHT: 120 LBS | RESPIRATION RATE: 22 BRPM | TEMPERATURE: 97 F | DIASTOLIC BLOOD PRESSURE: 90 MMHG | BODY MASS INDEX: 20.49 KG/M2 | HEART RATE: 76 BPM

## 2018-03-02 DIAGNOSIS — G51.0 BELL'S PALSY: ICD-10-CM

## 2018-03-02 LAB
ALBUMIN SERPL-MCNC: 3.6 G/DL (ref 3.4–5)
ALP SERPL-CCNC: 99 U/L (ref 40–150)
ALT SERPL W P-5'-P-CCNC: 14 U/L (ref 0–50)
ANION GAP SERPL CALCULATED.3IONS-SCNC: 6 MMOL/L (ref 3–14)
APTT PPP: 30 SEC (ref 22–37)
AST SERPL W P-5'-P-CCNC: 13 U/L (ref 0–45)
B BURGDOR IGG+IGM SER QL: <0.01 (ref 0–0.89)
BASOPHILS # BLD AUTO: 0 10E9/L (ref 0–0.2)
BASOPHILS NFR BLD AUTO: 0.1 %
BILIRUB SERPL-MCNC: 0.5 MG/DL (ref 0.2–1.3)
BUN SERPL-MCNC: 14 MG/DL (ref 7–30)
CALCIUM SERPL-MCNC: 8.7 MG/DL (ref 8.5–10.1)
CHLORIDE SERPL-SCNC: 104 MMOL/L (ref 94–109)
CO2 SERPL-SCNC: 28 MMOL/L (ref 20–32)
CREAT SERPL-MCNC: 0.58 MG/DL (ref 0.52–1.04)
DIFFERENTIAL METHOD BLD: NORMAL
EOSINOPHIL # BLD AUTO: 0.1 10E9/L (ref 0–0.7)
EOSINOPHIL NFR BLD AUTO: 1.6 %
ERYTHROCYTE [DISTWIDTH] IN BLOOD BY AUTOMATED COUNT: 12.3 % (ref 10–15)
GFR SERPL CREATININE-BSD FRML MDRD: >90 ML/MIN/1.7M2
GLUCOSE BLDC GLUCOMTR-MCNC: 95 MG/DL (ref 70–99)
GLUCOSE SERPL-MCNC: 93 MG/DL (ref 70–99)
HCT VFR BLD AUTO: 42.6 % (ref 35–47)
HGB BLD-MCNC: 14.3 G/DL (ref 11.7–15.7)
IMM GRANULOCYTES # BLD: 0 10E9/L (ref 0–0.4)
IMM GRANULOCYTES NFR BLD: 0.4 %
INR PPP: 0.89 (ref 0.86–1.14)
LYMPHOCYTES # BLD AUTO: 1.8 10E9/L (ref 0.8–5.3)
LYMPHOCYTES NFR BLD AUTO: 26.4 %
MCH RBC QN AUTO: 29.8 PG (ref 26.5–33)
MCHC RBC AUTO-ENTMCNC: 33.6 G/DL (ref 31.5–36.5)
MCV RBC AUTO: 89 FL (ref 78–100)
MONOCYTES # BLD AUTO: 0.7 10E9/L (ref 0–1.3)
MONOCYTES NFR BLD AUTO: 10.4 %
NEUTROPHILS # BLD AUTO: 4.2 10E9/L (ref 1.6–8.3)
NEUTROPHILS NFR BLD AUTO: 61.1 %
PLATELET # BLD AUTO: 259 10E9/L (ref 150–450)
POTASSIUM SERPL-SCNC: 3.9 MMOL/L (ref 3.4–5.3)
PROT SERPL-MCNC: 7.5 G/DL (ref 6.8–8.8)
RBC # BLD AUTO: 4.8 10E12/L (ref 3.8–5.2)
SODIUM SERPL-SCNC: 138 MMOL/L (ref 133–144)
TROPONIN I SERPL-MCNC: <0.015 UG/L (ref 0–0.04)
WBC # BLD AUTO: 6.9 10E9/L (ref 4–11)

## 2018-03-02 PROCEDURE — 70498 CT ANGIOGRAPHY NECK: CPT

## 2018-03-02 PROCEDURE — 99285 EMERGENCY DEPT VISIT HI MDM: CPT | Mod: 25 | Performed by: FAMILY MEDICINE

## 2018-03-02 PROCEDURE — 93005 ELECTROCARDIOGRAM TRACING: CPT | Performed by: FAMILY MEDICINE

## 2018-03-02 PROCEDURE — 00000146 ZZHCL STATISTIC GLUCOSE BY METER IP

## 2018-03-02 PROCEDURE — 85025 COMPLETE CBC W/AUTO DIFF WBC: CPT | Performed by: FAMILY MEDICINE

## 2018-03-02 PROCEDURE — 80053 COMPREHEN METABOLIC PANEL: CPT | Performed by: FAMILY MEDICINE

## 2018-03-02 PROCEDURE — 93010 ELECTROCARDIOGRAM REPORT: CPT | Mod: Z6 | Performed by: FAMILY MEDICINE

## 2018-03-02 PROCEDURE — 96360 HYDRATION IV INFUSION INIT: CPT | Mod: 59 | Performed by: FAMILY MEDICINE

## 2018-03-02 PROCEDURE — 25000128 H RX IP 250 OP 636: Performed by: FAMILY MEDICINE

## 2018-03-02 PROCEDURE — 70450 CT HEAD/BRAIN W/O DYE: CPT

## 2018-03-02 PROCEDURE — 25000125 ZZHC RX 250: Performed by: FAMILY MEDICINE

## 2018-03-02 PROCEDURE — 84484 ASSAY OF TROPONIN QUANT: CPT | Performed by: FAMILY MEDICINE

## 2018-03-02 PROCEDURE — 86618 LYME DISEASE ANTIBODY: CPT | Performed by: FAMILY MEDICINE

## 2018-03-02 RX ORDER — PREDNISONE 20 MG/1
80 TABLET ORAL DAILY
Qty: 28 TABLET | Refills: 0 | Status: SHIPPED | OUTPATIENT
Start: 2018-03-02 | End: 2018-03-09

## 2018-03-02 RX ORDER — VALACYCLOVIR HYDROCHLORIDE 1 G/1
1000 TABLET, FILM COATED ORAL 3 TIMES DAILY
Qty: 21 TABLET | Refills: 0 | Status: SHIPPED | OUTPATIENT
Start: 2018-03-02 | End: 2020-07-30

## 2018-03-02 RX ORDER — IOPAMIDOL 755 MG/ML
70 INJECTION, SOLUTION INTRAVASCULAR ONCE
Status: COMPLETED | OUTPATIENT
Start: 2018-03-02 | End: 2018-03-02

## 2018-03-02 RX ADMIN — SODIUM CHLORIDE 100 ML: 9 INJECTION, SOLUTION INTRAVENOUS at 07:23

## 2018-03-02 RX ADMIN — IOPAMIDOL 70 ML: 755 INJECTION, SOLUTION INTRAVENOUS at 07:23

## 2018-03-02 RX ADMIN — SODIUM CHLORIDE 500 ML: 9 INJECTION, SOLUTION INTRAVENOUS at 07:42

## 2018-03-02 NOTE — DISCHARGE INSTRUCTIONS
Caldera s Palsy    Bell's Palsy is a problem involving the nerve that controls the muscles on one side of the face.  The cause is unknown, but may be related to inflammation of the nerve. Symptoms usually appear only on the affected side. They may include:    Inability to close the eyelid    Tearing of the eye    Facial drooping    Drooling    Numbness or pain    Changes in taste    Sound sensitivity  Damage to the eye can be a serious problem. The inability to blink can cause the eye to dry out. An ulcer (sore) can then form on the cornea. Also, not blinking means that the eye has no protection from dirt and dust particles.  Treatment involves protecting and moistening the eye. Medicines may also help.  Most persons recover completely within 3 to 6 months. However, the condition sometimes returns months or years later.  Home care    Get plenty of rest and eat a healthy diet to help yourself recover.    Use Artificial Tears frequently during the day and at bedtime to prevent drying. These drops are available without prescription at your drug store.    Wear protective glasses especially when outside to protect from flying debris. Use sunglasses when outdoors.    Tape the eyelid closed at bedtime with a paper tape (available at your pharmacy). This has a very mild adhesive to avoid injury to the lid. This will protect your eye from injury while you sleep.    Sometimes medicines are prescribed to reduce inflammation or treat specific viral infections of the nerve. If medicines are prescribed, take them exactly as directed. Usually there is a 10-day course of medication that is started as soon as possible. Taking this medicine as prescribed will help with a full recovery.    Use low heat, for example from a heating pad, on the affected area. This can help reduce pain and swelling.    If you are experiencing sever pain, contact your health care provider.  Follow-up care  Follow up with your healthcare provider as advised.  If you referred to a specialist, make that appointment promptly.  When to seek medical advice  Call your healthcare provider if any of the following occur:    Severe eye redness    Eye pain    Thick drainage from the eye    Change in vision (such as double vision or losing vision)    Fever over 100.4 F (38 C) or as directed by your healthcare provider    Headache, neck pain, weakness, trouble speaking or walking, or other unexplained symptoms  Date Last Reviewed: 8/23/2015 2000-2017 [employer]. [address] [place] [address]. All rights reserved. This information is not intended as a substitute for professional medical care. Always follow your healthcare professional's instructions.    Prednisone 80 mg once daily times a week.  Valtrex 1 g 3 times a day ×1 week.  Recheck in her physician's office in 2-3 days.  Return to the emergency department if worse or changes.

## 2018-03-02 NOTE — ED PROVIDER NOTES
History     Chief Complaint   Patient presents with     Numbness     to right lower lip     HPI  Lina Sanford is a 62 year old female, past medical history significant for rotator cuff impingement syndrome, postmenopausal bleeding, history of herpes zoster, presents to the emergency department with concerns of numbness to her right lower lip.  History is obtained from the patient states that she felt fine other than a moderate all over headache which was dull and aching when she went to bed last night.  When she woke this morning the angle of her right mouth area felt somewhat numb and tingly, the headache was gone, and she noticed that the right side of her lower face was not moving properly.  Especially her smile.  Her  notices 2.  She had no difficulties communicating, no dysarthria, no expressive aphasia by history, no weakness of arms or legs or paresthesias of arms or legs.  There is no nausea or vomiting.  No recent illness.  Patient has never had symptoms like this before.    Problem List:    Patient Active Problem List    Diagnosis Date Noted     Advanced directives, counseling/discussion 07/21/2017     Priority: Medium     Advance Care Planning 7/21/2017: ACP Review of Chart / Resources Provided:  Reviewed chart for advance care plan.  Lina Sanford has been provided information and resources to begin or update their advance care plan.  Added by Yoanna Amaral            CARDIOVASCULAR SCREENING; LDL GOAL LESS THAN 160 10/31/2010     Priority: Medium     Rotator cuff impingement syndrome 05/12/2009     Priority: Medium     Postmenopausal bleeding 05/08/2009     Priority: Medium        Past Medical History:    No past medical history on file.    Past Surgical History:    Past Surgical History:   Procedure Laterality Date     C APPENDECTOMY  1963     COLONOSCOPY  1/9/2012    Procedure:COLONOSCOPY; colonoscopy; Surgeon:ANDRIY PETERSON; Location:[facility]     SURGICAL HISTORY OF -   1975     "surgical repair of dislocated R middle finger       Family History:    Family History   Problem Relation Age of Onset     HEART DISEASE Maternal Grandmother      pacemaker     Thyroid Disease Maternal Grandmother      goiter     C.A.D. Maternal Grandmother      C.A.D. Paternal Grandmother      C.A.D. Paternal Grandfather      Hypertension Paternal Grandfather      CEREBROVASCULAR DISEASE Paternal Grandfather      C.A.D. Sister      at age 50, no MI but strain     DIABETES No family hx of      Breast Cancer No family hx of      Cancer - colorectal No family hx of        Social History:  Marital Status:   [2]  Social History   Substance Use Topics     Smoking status: Never Smoker     Smokeless tobacco: Never Used     Alcohol use Yes      Comment: occasionally approx 2 drinks per week        Medications:      predniSONE (DELTASONE) 20 MG tablet   valACYclovir (VALTREX) 1000 mg tablet   valACYclovir (VALTREX) 1000 mg tablet   gabapentin (NEURONTIN) 300 MG capsule         Review of Systems   All other systems reviewed and are negative.      Physical Exam   BP: (!) 153/93 (Simultaneous filing. User may not have seen previous data.)  Pulse: 76  Heart Rate: 71  Temp: 97  F (36.1  C)  Resp: 18  Height: 162.6 cm (5' 4\")  Weight: 54.4 kg (120 lb)  SpO2: 98 % (Simultaneous filing. User may not have seen previous data.)      Physical Exam   Constitutional: She is oriented to person, place, and time. She appears well-developed and well-nourished.   HENT:   Head: Normocephalic and atraumatic.   Right Ear: External ear normal.   Left Ear: External ear normal.   Nose: Nose normal.   Mouth/Throat: Oropharynx is clear and moist.   Subtle facial asymmetry with the right angle of mouth not moving compared to the left side and loss of wrinkling on the right lower face.  The forehead move symmetrically as do the eyelids.   Eyes: Conjunctivae and EOM are normal. Pupils are equal, round, and reactive to light.   Neck: Normal range of " motion. Neck supple.   Cardiovascular: Normal rate, regular rhythm, normal heart sounds and intact distal pulses.    Pulmonary/Chest: Effort normal.   Abdominal: Soft. Bowel sounds are normal.   Musculoskeletal: Normal range of motion.   Neurological: She is alert and oriented to person, place, and time.   Skin: Skin is warm and dry.   Psychiatric: She has a normal mood and affect. Her behavior is normal.   Nursing note and vitals reviewed.      ED Course     ED Course     Procedures               EKG Interpretation:      Interpreted by Parrish Brooks  Time reviewed: 06:58  Sinus rhythm 70 bpm no acute ST-T wave changes.  Normal axis normal intervals.  Normal EKG.    Results for orders placed or performed during the hospital encounter of 03/02/18   CT Head w/o Contrast    Narrative    CT SCAN OF THE HEAD WITHOUT CONTRAST   3/2/2018 7:34 AM     HISTORY: Right-sided facial weakness, headache.     TECHNIQUE:  Axial images of the head and coronal reformations without  IV contrast material.  Radiation dose for this scan was reduced using  automated exposure control, adjustment of the mA and/or kV according  to patient size, or iterative reconstruction technique.    COMPARISON: None.    FINDINGS:  The ventricles are normal in size, shape and configuration.   The brain parenchyma and subarachnoid spaces are normal. There is no  evidence of intracranial hemorrhage, mass, acute infarct or anomaly.     The visualized portions of the sinuses and mastoids appear normal.  There is no evidence of trauma.      Impression    IMPRESSION:  Normal CT scan of the head.     DASH MITTAL MD   CT Head Neck Angio w/o & w Contrast    Narrative    CT ANGIOGRAM OF THE HEAD AND NECK WITHOUT AND WITH CONTRAST  3/2/2018  7:41 AM     COMPARISON: None.    HISTORY: Right-sided facial weakness, headache.    TECHNIQUE:  Precontrast localizing scans were followed by CT  angiography with an injection of 70 mL Isovue 370 nonionic  intravenous  contrast material with scans through the head and neck.  Images were  transferred to a separate 3-D workstation where multiplanar  reformations and 3-D images were created.  Estimates of carotid  stenoses are made relative to the distal internal carotid artery  diameters except as noted.      FINDINGS:   Neck CTA: The common carotid arteries bilaterally are patent without  stenosis. The cervical internal carotid arteries bilaterally are  patent without stenosis. The dominant left and small right vertebral  arteries are patent and unremarkable.    Head CTA: The basilar, bilateral distal internal carotid, bilateral  anterior cerebral, bilateral middle cerebral and bilateral posterior  cerebral arteries are patent and unremarkable. The anterior  communicating and bilateral posterior communicating arteries are  patent and unremarkable.      Impression    IMPRESSION: Normal neck and head CTA.      Radiation dose for this scan was reduced using automated exposure  control, adjustment of the mA and/or kV according to patient size, or  iterative reconstruction technique.                 Critical Care time:  none               Labs Ordered and Resulted from Time of ED Arrival Up to the Time of Departure from the ED   CBC WITH PLATELETS DIFFERENTIAL   COMPREHENSIVE METABOLIC PANEL   TROPONIN I   GLUCOSE BY METER   INR   PARTIAL THROMBOPLASTIN TIME   PERIPHERAL IV CATHETER   GLUCOSE MONITOR NURSING POCT   VITAL SIGNS AND NEURO CHECKS   ACTIVITY   PULSE OXIMETRY NURSING   GLUCOSE MONITOR NURSING POCT   ASSESSMENT     Results for orders placed or performed during the hospital encounter of 03/02/18   CT Head w/o Contrast    Narrative    CT SCAN OF THE HEAD WITHOUT CONTRAST   3/2/2018 7:34 AM     HISTORY: Right-sided facial weakness, headache.     TECHNIQUE:  Axial images of the head and coronal reformations without  IV contrast material.  Radiation dose for this scan was reduced using  automated exposure control,  adjustment of the mA and/or kV according  to patient size, or iterative reconstruction technique.    COMPARISON: None.    FINDINGS:  The ventricles are normal in size, shape and configuration.   The brain parenchyma and subarachnoid spaces are normal. There is no  evidence of intracranial hemorrhage, mass, acute infarct or anomaly.     The visualized portions of the sinuses and mastoids appear normal.  There is no evidence of trauma.      Impression    IMPRESSION:  Normal CT scan of the head.     DASH MITTAL MD   CT Head Neck Angio w/o & w Contrast    Narrative    CT ANGIOGRAM OF THE HEAD AND NECK WITHOUT AND WITH CONTRAST  3/2/2018  7:41 AM     COMPARISON: None.    HISTORY: Right-sided facial weakness, headache.    TECHNIQUE:  Precontrast localizing scans were followed by CT  angiography with an injection of 70 mL Isovue 370 nonionic intravenous  contrast material with scans through the head and neck.  Images were  transferred to a separate 3-D workstation where multiplanar  reformations and 3-D images were created.  Estimates of carotid  stenoses are made relative to the distal internal carotid artery  diameters except as noted.      FINDINGS:   Neck CTA: The common carotid arteries bilaterally are patent without  stenosis. The cervical internal carotid arteries bilaterally are  patent without stenosis. The dominant left and small right vertebral  arteries are patent and unremarkable.    Head CTA: The basilar, bilateral distal internal carotid, bilateral  anterior cerebral, bilateral middle cerebral and bilateral posterior  cerebral arteries are patent and unremarkable. The anterior  communicating and bilateral posterior communicating arteries are  patent and unremarkable.      Impression    IMPRESSION: Normal neck and head CTA.      Radiation dose for this scan was reduced using automated exposure  control, adjustment of the mA and/or kV according to patient size, or  iterative reconstruction technique.     8:34  AM  Lab diagnostics resulted during the emergency department visit and imaging studies are reviewed with the patient I answered her questions.    Assessments & Plan (with Medical Decision Making)   62-year-old female past medical history reviewed as above, presents the emergency department with right lower lip numbness.  Physical exam reveals a facial asymmetry involving the lower right face only.  Otherwise normal symmetric exam without neurologic asymmetry specifically.  Differential diagnosis was discussed with the patient imaging studies of her head and neck obtained.  No acute findings.  Lab diagnostics also reviewed above.  Proposed treatment with prednisone and Valtrex was discussed with the patient as well as interval follow-up in 2-3 days in clinic with her primary care provider.  Return to the emergency department if worse or changes in the interim.      Disclaimer: This note consists of symbols derived from keyboarding, dictation and/or voice recognition software. As a result, there may be errors in the script that have gone undetected. Please consider this when interpreting information found in this chart.      I have reviewed the nursing notes.    I have reviewed the findings, diagnosis, plan and need for follow up with the patient.       Discharge Medication List as of 3/2/2018  8:38 AM      START taking these medications    Details   predniSONE (DELTASONE) 20 MG tablet Take 4 tablets (80 mg) by mouth daily for 7 days, Disp-28 tablet, R-0, E-Prescribe      !! valACYclovir (VALTREX) 1000 mg tablet Take 1 tablet (1,000 mg) by mouth 3 times daily for 7 days, Disp-21 tablet, R-0, E-Prescribe       !! - Potential duplicate medications found. Please discuss with provider.          Final diagnoses:   Bell's palsy       3/2/2018   [facility] EMERGENCY DEPARTMENT     Parrish Brooks MD  03/02/18 0835       Parrish Brooks MD  03/07/18 0058

## 2018-03-02 NOTE — ED AVS SNAPSHOT
[facility] Emergency Department    [address] [place] [address]    Phone:  [phone]    Fax:  [phone]                                       Lina Sanford   MRN: [id number]    Department:  [facility] Emergency Department   Date of Visit:  3/2/2018           Patient Information     Date Of Birth          1955        Your diagnoses for this visit were:     Bell's palsy        You were seen by Parrish Brooks MD.      Follow-up Information     Follow up with Amanda Patel DO. Schedule an appointment as soon as possible for a visit in 2 days.    Specialties:  Family Practice, Urgent Care    Contact information:    [address] [place] [address]  [phone]          Discharge Instructions         Caldera s Palsy    Bell's Palsy is a problem involving the nerve that controls the muscles on one side of the face.  The cause is unknown, but may be related to inflammation of the nerve. Symptoms usually appear only on the affected side. They may include:    Inability to close the eyelid    Tearing of the eye    Facial drooping    Drooling    Numbness or pain    Changes in taste    Sound sensitivity  Damage to the eye can be a serious problem. The inability to blink can cause the eye to dry out. An ulcer (sore) can then form on the cornea. Also, not blinking means that the eye has no protection from dirt and dust particles.  Treatment involves protecting and moistening the eye. Medicines may also help.  Most persons recover completely within 3 to 6 months. However, the condition sometimes returns months or years later.  Home care    Get plenty of rest and eat a healthy diet to help yourself recover.    Use Artificial Tears frequently during the day and at bedtime to prevent drying. These drops are available without prescription at your drug store.    Wear protective glasses especially when outside to protect from flying debris. Use sunglasses when outdoors.    Tape the  eyelid closed at bedtime with a paper tape (available at your pharmacy). This has a very mild adhesive to avoid injury to the lid. This will protect your eye from injury while you sleep.    Sometimes medicines are prescribed to reduce inflammation or treat specific viral infections of the nerve. If medicines are prescribed, take them exactly as directed. Usually there is a 10-day course of medication that is started as soon as possible. Taking this medicine as prescribed will help with a full recovery.    Use low heat, for example from a heating pad, on the affected area. This can help reduce pain and swelling.    If you are experiencing sever pain, contact your health care provider.  Follow-up care  Follow up with your healthcare provider as advised. If you referred to a specialist, make that appointment promptly.  When to seek medical advice  Call your healthcare provider if any of the following occur:    Severe eye redness    Eye pain    Thick drainage from the eye    Change in vision (such as double vision or losing vision)    Fever over 100.4 F (38 C) or as directed by your healthcare provider    Headache, neck pain, weakness, trouble speaking or walking, or other unexplained symptoms  Date Last Reviewed: 8/23/2015 2000-2017 [employer]. [address] [place] [address]. All rights reserved. This information is not intended as a substitute for professional medical care. Always follow your healthcare professional's instructions.    Prednisone 80 mg once daily times a week.  Valtrex 1 g 3 times a day ×1 week.  Recheck in her physician's office in 2-3 days.  Return to the emergency department if worse or changes.      24 Hour Appointment Hotline       To make an appointment at any [facility] clinic, call [phone] ([phone]). If you don't have a family doctor or clinic, we will help you find one. [facility] clinics are conveniently located to serve the needs of you and your  family.             Review of your medicines      START taking        Dose / Directions Last dose taken    predniSONE 20 MG tablet   Commonly known as:  DELTASONE   Dose:  80 mg   Quantity:  28 tablet        Take 4 tablets (80 mg) by mouth daily for 7 days   Refills:  0          CONTINUE these medicines which may have CHANGED, or have new prescriptions. If we are uncertain of the size of tablets/capsules you have at home, strength may be listed as something that might have changed.        Dose / Directions Last dose taken    * valACYclovir 1000 mg tablet   Commonly known as:  VALTREX   Dose:  1000 mg   What changed:  Another medication with the same name was added. Make sure you understand how and when to take each.   Quantity:  21 tablet        Take 1 tablet (1,000 mg) by mouth 3 times daily   Refills:  0        * valACYclovir 1000 mg tablet   Commonly known as:  VALTREX   Dose:  1000 mg   What changed:  You were already taking a medication with the same name, and this prescription was added. Make sure you understand how and when to take each.   Quantity:  21 tablet        Take 1 tablet (1,000 mg) by mouth 3 times daily for 7 days   Refills:  0        * Notice:  This list has 2 medication(s) that are the same as other medications prescribed for you. Read the directions carefully, and ask your doctor or other care provider to review them with you.      Our records show that you are taking the medicines listed below. If these are incorrect, please call your family doctor or clinic.        Dose / Directions Last dose taken    gabapentin 300 MG capsule   Commonly known as:  NEURONTIN   Quantity:  30 capsule        Take 1 tablet (300 mg) every night for 3 days, then 1 tablet twice daily for 3 days, then 1 tablet three times daily   Refills:  0                Prescriptions were sent or printed at these locations (2 Prescriptions)                   [facility] [place], [place] - [address] [address]    Telephone:  [phone]   Fax:  [phone]   Hours:                  E-Prescribed (2 of 2)         predniSONE (DELTASONE) 20 MG tablet               valACYclovir (VALTREX) 1000 mg tablet                Procedures and tests performed during your visit     Procedure/Test Number of Times Performed    Activity: Bedrest 1    CBC with platelets differential 1    CT Head Neck Angio w/o & w Contrast 1    CT Head w/o Contrast 1    Comprehensive metabolic panel 1    Dysphagia Screen 1    EKG 12-lead, tracing only 1    Glucose by meter 1    Glucose monitor nursing POCT 2    INR 1    Lyme Disease Teri with reflex to WB Serum 1    Partial thromboplastin time 1    Peripheral IV catheter 1    Pulse oximetry nursing 1    Troponin I 1    Vital signs and neuro checks 1      Orders Needing Specimen Collection     None      Pending Results     Date and Time Order Name Status Description    3/2/2018 0831 Lyme Disease Teri with reflex to WB Serum In process     3/2/2018 0706 CT Head Neck Angio w/o & w Contrast Preliminary             Pending Culture Results     No orders found from 2/28/2018 to 3/3/2018.            Pending Results Instructions     If you had any lab results that were not finalized at the time of your Discharge, you can call the ED Lab Result RN at [phone]. You will be contacted by this team for any positive Lab results or changes in treatment. The nurses are available 7 days a week from 10A to 6:30P.  You can leave a message 24 hours per day and they will return your call.        Test Results From Your Hospital Stay        3/2/2018  6:54 AM      Component Results     Component Value Ref Range & Units Status    WBC 6.9 4.0 - 11.0 10e9/L Final    RBC Count 4.80 3.8 - 5.2 10e12/L Final    Hemoglobin 14.3 11.7 - 15.7 g/dL Final    Hematocrit 42.6 35.0 - 47.0 % Final    MCV 89 78 - 100 fl Final    MCH 29.8 26.5 - 33.0 pg Final    MCHC 33.6 31.5 - 36.5 g/dL Final    RDW 12.3 10.0 - 15.0  % Final    Platelet Count 259 150 - 450 10e9/L Final    Diff Method Automated Method  Final    % Neutrophils 61.1 % Final    % Lymphocytes 26.4 % Final    % Monocytes 10.4 % Final    % Eosinophils 1.6 % Final    % Basophils 0.1 % Final    % Immature Granulocytes 0.4 % Final    Absolute Neutrophil 4.2 1.6 - 8.3 10e9/L Final    Absolute Lymphocytes 1.8 0.8 - 5.3 10e9/L Final    Absolute Monocytes 0.7 0.0 - 1.3 10e9/L Final    Absolute Eosinophils 0.1 0.0 - 0.7 10e9/L Final    Absolute Basophils 0.0 0.0 - 0.2 10e9/L Final    Abs Immature Granulocytes 0.0 0 - 0.4 10e9/L Final         3/2/2018  7:16 AM      Component Results     Component Value Ref Range & Units Status    Sodium 138 133 - 144 mmol/L Final    Potassium 3.9 3.4 - 5.3 mmol/L Final    Chloride 104 94 - 109 mmol/L Final    Carbon Dioxide 28 20 - 32 mmol/L Final    Anion Gap 6 3 - 14 mmol/L Final    Glucose 93 70 - 99 mg/dL Final    Urea Nitrogen 14 7 - 30 mg/dL Final    Creatinine 0.58 0.52 - 1.04 mg/dL Final    GFR Estimate >90 >60 mL/min/1.7m2 Final    Non  GFR Calc    GFR Estimate If Black >90 >60 mL/min/1.7m2 Final    African American GFR Calc    Calcium 8.7 8.5 - 10.1 mg/dL Final    Bilirubin Total 0.5 0.2 - 1.3 mg/dL Final    Albumin 3.6 3.4 - 5.0 g/dL Final    Protein Total 7.5 6.8 - 8.8 g/dL Final    Alkaline Phosphatase 99 40 - 150 U/L Final    ALT 14 0 - 50 U/L Final    AST 13 0 - 45 U/L Final         3/2/2018  7:16 AM      Component Results     Component Value Ref Range & Units Status    Troponin I ES <0.015 0.000 - 0.045 ug/L Final    The 99th percentile for upper reference range is 0.045 ug/L.  Troponin values   in the range of 0.045 - 0.120 ug/L may be associated with risks of adverse   clinical events.           3/2/2018  6:57 AM      Component Results     Component Value Ref Range & Units Status    Glucose 95 70 - 99 mg/dL Final         3/2/2018  7:24 AM      Component Results     Component Value Ref Range & Units Status    INR  0.89 0.86 - 1.14 Final         3/2/2018  7:24 AM      Component Results     Component Value Ref Range & Units Status    PTT 30 22 - 37 sec Final         3/2/2018  7:46 AM      Narrative     CT SCAN OF THE HEAD WITHOUT CONTRAST   3/2/2018 7:34 AM     HISTORY: Right-sided facial weakness, headache.     TECHNIQUE:  Axial images of the head and coronal reformations without  IV contrast material.  Radiation dose for this scan was reduced using  automated exposure control, adjustment of the mA and/or kV according  to patient size, or iterative reconstruction technique.    COMPARISON: None.    FINDINGS:  The ventricles are normal in size, shape and configuration.   The brain parenchyma and subarachnoid spaces are normal. There is no  evidence of intracranial hemorrhage, mass, acute infarct or anomaly.     The visualized portions of the sinuses and mastoids appear normal.  There is no evidence of trauma.        Impression     IMPRESSION:  Normal CT scan of the head.     DASH MITTAL MD         3/2/2018  7:46 AM      Narrative     CT ANGIOGRAM OF THE HEAD AND NECK WITHOUT AND WITH CONTRAST  3/2/2018  7:41 AM     COMPARISON: None.    HISTORY: Right-sided facial weakness, headache.    TECHNIQUE:  Precontrast localizing scans were followed by CT  angiography with an injection of 70 mL Isovue 370 nonionic intravenous  contrast material with scans through the head and neck.  Images were  transferred to a separate 3-D workstation where multiplanar  reformations and 3-D images were created.  Estimates of carotid  stenoses are made relative to the distal internal carotid artery  diameters except as noted.      FINDINGS:   Neck CTA: The common carotid arteries bilaterally are patent without  stenosis. The cervical internal carotid arteries bilaterally are  patent without stenosis. The dominant left and small right vertebral  arteries are patent and unremarkable.    Head CTA: The basilar, bilateral distal internal carotid,  bilateral  anterior cerebral, bilateral middle cerebral and bilateral posterior  cerebral arteries are patent and unremarkable. The anterior  communicating and bilateral posterior communicating arteries are  patent and unremarkable.        Impression     IMPRESSION: Normal neck and head CTA.      Radiation dose for this scan was reduced using automated exposure  control, adjustment of the mA and/or kV according to patient size, or  iterative reconstruction technique.         3/2/2018  8:33 AM                Thank you for choosing [facility]       Thank you for choosing [facility] for your care. Our goal is always to provide you with excellent care. Hearing back from our patients is one way we can continue to improve our services. Please take a few minutes to complete the written survey that you may receive in the mail after you visit with us. Thank you!        [employer] Information     [facility] gives you secure access to your electronic health record. If you see a primary care provider, you can also send messages to your care team and make appointments. If you have questions, please call your primary care clinic.  If you do not have a primary care provider, please call [phone] and they will assist you.        Care EveryWhere ID     This is your Care EveryWhere ID. This could be used by other organizations to access your [facility] medical records  [id number]        Equal Access to Services     ANA AYALA : Hadyefri Castañeda, judson hewitt, joe logan, khadijah alvarez . So [facility] [phone].    ATENCIÓN: Si habla español, tiene a victoria disposición servicios gratuitos de asistencia lingüística. Llame al [phone].    We comply with applicable federal civil rights laws and [place] laws. We do not discriminate on the basis of race, color, national origin, age, disability, sex, sexual orientation, or gender identity.            After Visit Summary       This is your  record. Keep this with you and show to your community pharmacist(s) and doctor(s) at your next visit.

## 2018-03-02 NOTE — ED AVS SNAPSHOT
[facility] Emergency Department    [address] [address]    Phone:  [phone]    Fax:  [phone]                                       Lina Sanford   MRN: [id number]    Department:  [facility] Emergency Department   Date of Visit:  3/2/2018           After Visit Summary Signature Page     I have received my discharge instructions, and my questions have been answered. I have discussed any challenges I see with this plan with the nurse or doctor.    ..........................................................................................................................................  Patient/Patient Representative Signature      ..........................................................................................................................................  Patient Representative Print Name and Relationship to Patient    ..................................................               ................................................  Date                                            Time    ..........................................................................................................................................  Reviewed by Signature/Title    ...................................................              ..............................................  Date                                                            Time

## 2018-03-02 NOTE — ED NOTES
Patient stated last night she had a headache all over.  Headache did go away when she went to bed.  This morning woke up with numbness to side right lower lip with some numbness to right jaw.  No facial droop or slurred speech.  No headache or dizziness.  No nausea vomiting or diarrhea.  No chest pain

## 2018-03-05 ENCOUNTER — OFFICE VISIT (OUTPATIENT)
Dept: FAMILY MEDICINE | Facility: CLINIC | Age: 63
End: 2018-03-05
Payer: COMMERCIAL

## 2018-03-05 VITALS
HEART RATE: 78 BPM | RESPIRATION RATE: 16 BRPM | SYSTOLIC BLOOD PRESSURE: 136 MMHG | DIASTOLIC BLOOD PRESSURE: 80 MMHG | OXYGEN SATURATION: 98 % | TEMPERATURE: 98.2 F | BODY MASS INDEX: 21.66 KG/M2 | WEIGHT: 126.2 LBS

## 2018-03-05 DIAGNOSIS — G51.0 BELL'S PALSY: Primary | ICD-10-CM

## 2018-03-05 PROCEDURE — 99213 OFFICE O/P EST LOW 20 MIN: CPT | Performed by: NURSE PRACTITIONER

## 2018-03-05 ASSESSMENT — ENCOUNTER SYMPTOMS
SORE THROAT: 0
NUMBNESS: 1
ABDOMINAL PAIN: 0
PALPITATIONS: 0
ARTHRALGIAS: 0
FATIGUE: 0
DIARRHEA: 0
EYE ITCHING: 1
CONSTIPATION: 0
CHEST TIGHTNESS: 0
DIZZINESS: 0
LIGHT-HEADEDNESS: 0
VOMITING: 0
ABDOMINAL DISTENTION: 0
MYALGIAS: 0
SHORTNESS OF BREATH: 0
WHEEZING: 0
RHINORRHEA: 0
NAUSEA: 0
COUGH: 0
HEADACHES: 0

## 2018-03-05 ASSESSMENT — PAIN SCALES - GENERAL: PAINLEVEL: MILD PAIN (2)

## 2018-03-05 NOTE — PATIENT INSTRUCTIONS
These are general instructions and may not be specific to you. Please call, email or follow up if you have any questions or concerns.     Keep taking meds prescribed in the ER    Plan to return Fri at 820 for recheck       Caldera s Palsy    Bell's Palsy is a problem involving the nerve that controls the muscles on one side of the face.  The cause is unknown, but may be related to inflammation of the nerve. Symptoms usually appear only on the affected side. They may include:    Inability to close the eyelid    Tearing of the eye    Facial drooping    Drooling    Numbness or pain    Changes in taste    Sound sensitivity  Damage to the eye can be a serious problem. The inability to blink can cause the eye to dry out. An ulcer (sore) can then form on the cornea. Also, not blinking means that the eye has no protection from dirt and dust particles.  Treatment involves protecting and moistening the eye. Medicines may also help.  Most persons recover completely within 3 to 6 months. However, the condition sometimes returns months or years later.  Home care    Get plenty of rest and eat a healthy diet to help yourself recover.    Use Artificial Tears frequently during the day and at bedtime to prevent drying. These drops are available without prescription at your drug store.    Wear protective glasses especially when outside to protect from flying debris. Use sunglasses when outdoors.    Tape the eyelid closed at bedtime with a paper tape (available at your pharmacy). This has a very mild adhesive to avoid injury to the lid. This will protect your eye from injury while you sleep.    Sometimes medicines are prescribed to reduce inflammation or treat specific viral infections of the nerve. If medicines are prescribed, take them exactly as directed. Usually there is a 10-day course of medication that is started as soon as possible. Taking this medicine as prescribed will help with a full recovery.    Use low heat, for example  from a heating pad, on the affected area. This can help reduce pain and swelling.    If you are experiencing sever pain, contact your health care provider.  Follow-up care  Follow up with your healthcare provider as advised. If you referred to a specialist, make that appointment promptly.  When to seek medical advice  Call your healthcare provider if any of the following occur:    Severe eye redness    Eye pain    Thick drainage from the eye    Change in vision (such as double vision or losing vision)    Fever over 100.4 F (38 C) or as directed by your healthcare provider    Headache, neck pain, weakness, trouble speaking or walking, or other unexplained symptoms  Date Last Reviewed: 8/23/2015 2000-2017 [employer]. [address] [place] [address], [place] [address]. All rights reserved. This information is not intended as a substitute for professional medical care. Always follow your healthcare professional's instructions.

## 2018-03-05 NOTE — NURSING NOTE
"Chief Complaint   Patient presents with     ER F/U       Initial /80 (BP Location: Right arm, Patient Position: Sitting, Cuff Size: Adult Regular)  Pulse 78  Temp 98.2  F (36.8  C) (Tympanic)  Resp 16  Wt 126 lb 3.2 oz (57.2 kg)  LMP 06/12/2007  SpO2 98%  BMI 21.66 kg/m2 Estimated body mass index is 21.66 kg/(m^2) as calculated from the following:    Height as of 3/2/18: 5' 4\" (1.626 m).    Weight as of this encounter: 126 lb 3.2 oz (57.2 kg).  Medication Reconciliation: complete     April LILIAN Ontiveros      "

## 2018-03-05 NOTE — MR AVS SNAPSHOT
After Visit Summary   3/5/2018    Lina Sanford    MRN: [id number]           Patient Information     Date Of Birth          1955        Visit Information        Provider Department      3/5/2018 3:40 PM Elen Huizar APRN [facility]        Today's Diagnoses     Bell's palsy    -  1      Care Instructions    These are general instructions and may not be specific to you. Please call, email or follow up if you have any questions or concerns.     Keep taking meds prescribed in the ER    Plan to return Fri at 820 for recheck       Caldera s Palsy    Bell's Palsy is a problem involving the nerve that controls the muscles on one side of the face.  The cause is unknown, but may be related to inflammation of the nerve. Symptoms usually appear only on the affected side. They may include:    Inability to close the eyelid    Tearing of the eye    Facial drooping    Drooling    Numbness or pain    Changes in taste    Sound sensitivity  Damage to the eye can be a serious problem. The inability to blink can cause the eye to dry out. An ulcer (sore) can then form on the cornea. Also, not blinking means that the eye has no protection from dirt and dust particles.  Treatment involves protecting and moistening the eye. Medicines may also help.  Most persons recover completely within 3 to 6 months. However, the condition sometimes returns months or years later.  Home care    Get plenty of rest and eat a healthy diet to help yourself recover.    Use Artificial Tears frequently during the day and at bedtime to prevent drying. These drops are available without prescription at your drug store.    Wear protective glasses especially when outside to protect from flying debris. Use sunglasses when outdoors.    Tape the eyelid closed at bedtime with a paper tape (available at your pharmacy). This has a very mild adhesive to avoid injury to the lid. This will protect your eye from injury while you  sleep.    Sometimes medicines are prescribed to reduce inflammation or treat specific viral infections of the nerve. If medicines are prescribed, take them exactly as directed. Usually there is a 10-day course of medication that is started as soon as possible. Taking this medicine as prescribed will help with a full recovery.    Use low heat, for example from a heating pad, on the affected area. This can help reduce pain and swelling.    If you are experiencing sever pain, contact your health care provider.  Follow-up care  Follow up with your healthcare provider as advised. If you referred to a specialist, make that appointment promptly.  When to seek medical advice  Call your healthcare provider if any of the following occur:    Severe eye redness    Eye pain    Thick drainage from the eye    Change in vision (such as double vision or losing vision)    Fever over 100.4 F (38 C) or as directed by your healthcare provider    Headache, neck pain, weakness, trouble speaking or walking, or other unexplained symptoms  Date Last Reviewed: 8/23/2015 2000-2017 [employer]. [address] [place] [address]. All rights reserved. This information is not intended as a substitute for professional medical care. Always follow your healthcare professional's instructions.                Follow-ups after your visit        Your next 10 appointments already scheduled     Mar 05, 2018  3:40 PM CST   SHORT with UVALDO [facility] ([facility])    [address] [place] [id number] [phone]              Who to contact     Normal or non-critical lab and imaging results will be communicated to you by MyChart, letter or phone within 4 business days after the clinic has received the results. If you do not hear from us within 7 days, please contact the clinic through MyChart or phone. If you have a critical or abnormal lab result, we will  notify you by phone as soon as possible.  Submit refill requests through [employer]oint[employer] or call your pharmacy and they will forward the refill request to us. Please allow 3 business days for your refill to be completed.          If you need to speak with a  for additional information , please call: [phone]           Additional Information About Your Visit        Smart[employer]s [employer]Sense Information     Appointed[employer] gives you secure access to your electronic health record. If you see a primary care provider, you can also send messages to your care team and make appointments. If you have questions, please call your primary care clinic.  If you do not have a primary care provider, please call [phone] and they will assist you.        Care EveryWhere ID     This is your Care EveryWhere ID. This could be used by other organizations to access your [place] medical records  [id number]        Your Vitals Were     Pulse Temperature Respirations Last Period Pulse Oximetry BMI (Body Mass Index)    78 98.2  F (36.8  C) (Tympanic) 16 06/12/2007 98% 21.66 kg/m2       Blood Pressure from Last 3 Encounters:   03/05/18 136/80   03/02/18 132/90   11/10/17 124/80    Weight from Last 3 Encounters:   03/05/18 126 lb 3.2 oz (57.2 kg)   03/02/18 120 lb (54.4 kg)   11/10/17 123 lb 9.6 oz (56.1 kg)              Today, you had the following     No orders found for display       Primary Care Provider Office Phone # Fax #    Amanda Bertrandnataliia Patel [phone] [address] [place] [address]        Equal Access to Services     [facility]: Hadii aad ku hadasho Soomaali, waaxda luqadaha, qaybta kaalmada adeegyada, khadijah joel. So [facility] [phone].    ATENCIÓN: Si habla español, tiene a victoria disposición servicios gratuitos de asistencia lingüística. Llame al [phone].    We comply with applicable federal civil rights laws and [place] laws. We do not discriminate on the basis of race,  color, national origin, age, disability, sex, sexual orientation, or gender identity.            Thank you!     Thank you for choosing [facility]  for your care. Our goal is always to provide you with excellent care. Hearing back from our patients is one way we can continue to improve our services. Please take a few minutes to complete the written survey that you may receive in the mail after your visit with us. Thank you!             Your Updated Medication List - Protect others around you: Learn how to safely use, store and throw away your medicines at www.disposemymeds.org.          This list is accurate as of 3/5/18 11:19 AM.  Always use your most recent med list.                   Brand Name Dispense Instructions for use Diagnosis    gabapentin 300 MG capsule    NEURONTIN    30 capsule    Take 1 tablet (300 mg) every night for 3 days, then 1 tablet twice daily for 3 days, then 1 tablet three times daily    Herpes zoster without complication       predniSONE 20 MG tablet    DELTASONE    28 tablet    Take 4 tablets (80 mg) by mouth daily for 7 days        * valACYclovir 1000 mg tablet    VALTREX    21 tablet    Take 1 tablet (1,000 mg) by mouth 3 times daily    Herpes zoster without complication       * valACYclovir 1000 mg tablet    VALTREX    21 tablet    Take 1 tablet (1,000 mg) by mouth 3 times daily for 7 days        * Notice:  This list has 2 medication(s) that are the same as other medications prescribed for you. Read the directions carefully, and ask your doctor or other care provider to review them with you.

## 2018-03-05 NOTE — PROGRESS NOTES
SUBJECTIVE:   Lina Sanford is a 62 year old female who presents to clinic today for the following health issues:    ED/UC Followup:    Facility:  [facility] Emergency Department  Date of visit: 3/2/18  Reason for visit: Bell's palsy, numbness to right lower lip, CTA angiogram head neck, CT head  Current Status: numbness is worsening-entire right side of face now numb. Has to wear patches to close her eye completely.       Problem list and histories reviewed & adjusted, as indicated.  Additional history: as documented    Current Outpatient Prescriptions   Medication Sig Dispense Refill     predniSONE (DELTASONE) 20 MG tablet Take 4 tablets (80 mg) by mouth daily for 7 days 28 tablet 0     valACYclovir (VALTREX) 1000 mg tablet Take 1 tablet (1,000 mg) by mouth 3 times daily for 7 days 21 tablet 0     valACYclovir (VALTREX) 1000 mg tablet Take 1 tablet (1,000 mg) by mouth 3 times daily (Patient not taking: Reported on 3/5/2018) 21 tablet 0     gabapentin (NEURONTIN) 300 MG capsule Take 1 tablet (300 mg) every night for 3 days, then 1 tablet twice daily for 3 days, then 1 tablet three times daily (Patient not taking: Reported on 3/5/2018) 30 capsule 0     Allergies   Allergen Reactions     Nkda [No Known Drug Allergies]        Reviewed and updated as needed this visit by clinical staff  Tobacco  Allergies  Meds  Med Hx  Surg Hx  Fam Hx  Soc Hx      Reviewed and updated as needed this visit by Provider           Inability to feel right side of face. Thinks has gradually been coming on. Noticed that side was not the same over the last month and over the last month right eye has been scratchy in the AM. Feels like has been coming on for some time. No change in vision. Has had some trouble swallowing. Has been drinking out of the left side of mouth. Has been taking prednisone and also valtrex. Has been using eye patches at home and artificial tears.       ROS:  Review of Systems   Constitutional: Negative for  fatigue.   HENT: Negative for ear pain, rhinorrhea and sore throat.    Eyes: Positive for itching. Negative for visual disturbance.        Unable to close right eye all the way     Respiratory: Negative for cough, chest tightness, shortness of breath and wheezing.    Cardiovascular: Negative for chest pain, palpitations and leg swelling.   Gastrointestinal: Negative for abdominal distention, abdominal pain, constipation, diarrhea, nausea and vomiting.   Endocrine: Negative for cold intolerance and heat intolerance.   Musculoskeletal: Negative for arthralgias and myalgias.   Skin: Negative for rash.   Neurological: Positive for numbness (right side of face). Negative for dizziness, light-headedness and headaches.         OBJECTIVE:     /80 (BP Location: Right arm, Patient Position: Sitting, Cuff Size: Adult Regular)  Pulse 78  Temp 98.2  F (36.8  C) (Tympanic)  Resp 16  Wt 126 lb 3.2 oz (57.2 kg)  LMP 06/12/2007  SpO2 98%  BMI 21.66 kg/m2  Body mass index is 21.66 kg/(m^2).  Physical Exam   Constitutional: She appears well-developed and well-nourished.   HENT:   Head: Normocephalic and atraumatic.   Right Ear: Tympanic membrane and external ear normal. No middle ear effusion.   Left Ear: Tympanic membrane and external ear normal.  No middle ear effusion.   Nose: No mucosal edema.   Mouth/Throat: Oropharynx is clear and moist and mucous membranes are normal.   Neck: Carotid bruit is not present. No thyromegaly present.   Cardiovascular: Normal rate, regular rhythm and normal heart sounds.    Pulmonary/Chest: Effort normal and breath sounds normal.   Abdominal: Soft. Normal appearance and bowel sounds are normal.   Neurological: She is alert. She has normal strength. A cranial nerve deficit (crainal nerve V) and sensory deficit (cranial nerve V) is present.   When blinking unable to close right eye, is able to close right eye completely when clenching eyes.  Facial droop noted on the right side.  Unable to  raise right eyebrow, unable to blow out right cheek.   Skin: Skin is warm and dry.   Psychiatric: She has a normal mood and affect. Her behavior is normal.       ASSESSMENT/PLAN:   1. Bell's palsy  Continue prednisone and Valtrex.  Continue to use eye patches.  Reinforced importance of protecting eye and using eyedrops.  Plan to follow-up in clinic Friday morning.          UVALDO Gay [facility]

## 2018-03-09 ENCOUNTER — OFFICE VISIT (OUTPATIENT)
Dept: FAMILY MEDICINE | Facility: CLINIC | Age: 63
End: 2018-03-09
Payer: COMMERCIAL

## 2018-03-09 VITALS
RESPIRATION RATE: 12 BRPM | SYSTOLIC BLOOD PRESSURE: 120 MMHG | DIASTOLIC BLOOD PRESSURE: 70 MMHG | TEMPERATURE: 97.7 F | WEIGHT: 125.8 LBS | HEART RATE: 64 BPM | BODY MASS INDEX: 21.59 KG/M2

## 2018-03-09 DIAGNOSIS — G51.0 BELL'S PALSY: Primary | ICD-10-CM

## 2018-03-09 PROCEDURE — 99213 OFFICE O/P EST LOW 20 MIN: CPT | Performed by: NURSE PRACTITIONER

## 2018-03-09 ASSESSMENT — ENCOUNTER SYMPTOMS
EYE DISCHARGE: 0
RHINORRHEA: 0
SHORTNESS OF BREATH: 0
FATIGUE: 0
SINUS PRESSURE: 0
DIZZINESS: 0
COUGH: 0
NUMBNESS: 1
FEVER: 0
DIAPHORESIS: 0
LIGHT-HEADEDNESS: 0
SORE THROAT: 0
HEADACHES: 0
CONSTIPATION: 0
CHILLS: 0
WHEEZING: 0
DIARRHEA: 0
EYE ITCHING: 0
NAUSEA: 0

## 2018-03-09 ASSESSMENT — PAIN SCALES - GENERAL: PAINLEVEL: MODERATE PAIN (4)

## 2018-03-09 NOTE — MR AVS SNAPSHOT
After Visit Summary   3/9/2018    Lina Sanford    MRN: [id number]           Patient Information     Date Of Birth          1955        Visit Information        Provider Department      3/9/2018 8:20 AM Elen Huizar APRN [facility]        Today's Diagnoses     Bell's palsy    -  1       Follow-ups after your visit        Who to contact     Normal or non-critical lab and imaging results will be communicated to you by Memoir Systemshart, letter or phone within 4 business days after the clinic has received the results. If you do not hear from us within 7 days, please contact the clinic through Memoir Systemshart or phone. If you have a critical or abnormal lab result, we will notify you by phone as soon as possible.  Submit refill requests through [employer] or call your pharmacy and they will forward the refill request to us. Please allow 3 business days for your refill to be completed.          If you need to speak with a  for additional information , please call: [phone]           Additional Information About Your Visit        [employer]oir System[employer]ar[employer]swers Information     [employer] gives you secure access to your electronic health record. If you see a primary care provider, you can also send messages to your care team and make appointments. If you have questions, please call your primary care clinic.  If you do not have a primary care provider, please call [phone] and they will assist you.        Care EveryWhere ID     This is your Care EveryWhere ID. This could be used by other organizations to access your [place] medical records  [id number]        Your Vitals Were     Pulse Temperature Respirations Last Period BMI (Body Mass Index)       64 97.7  F (36.5  C) (Tympanic) 12 06/12/2007 21.59 kg/m2        Blood Pressure from Last 3 Encounters:   03/09/18 120/70   03/05/18 136/80   03/02/18 132/90    Weight from Last 3 Encounters:   03/09/18 125 lb 12.8 oz (57.1 kg)   03/05/18 126 lb  3.2 oz (57.2 kg)   03/02/18 120 lb (54.4 kg)              Today, you had the following     No orders found for display         Today's Medication Changes          These changes are accurate as of 3/9/18  8:39 AM.  If you have any questions, ask your nurse or doctor.               Start taking these medicines.        Dose/Directions    artificial tears Oint ophthalmic ointment   Used for:  Bell's palsy   Started by:  Elen Huizar APRN CNP        Dose:  1 inch   Place 1 g into the right eye At Bedtime   Quantity:  1 g   Refills:  1            Where to get your medicines      These medications were sent to [facility] - [place], [place] - [address]  [address], [facility] [address]     Phone:  [phone]     artificial tears Oint ophthalmic ointment                Primary Care Provider Office Phone # Fax #    Amanda Wright DO Jorge [phone] [address]   [facility] [address]        Equal Access to Services     [facility]: Hadii aad ku hadasho Soomaali, waaxda luqadaha, qaybta kaalmada adeegyada, waxay idiin hayaan brianna joel. So [facility] [phone].    ATENCIÓN: Si habla español, tiene a victoria disposición servicios gratuitos de asistencia lingüística. Llame al [phone].    We comply with applicable federal civil rights laws and [place] laws. We do not discriminate on the basis of race, color, national origin, age, disability, sex, sexual orientation, or gender identity.            Thank you!     Thank you for choosing [facility]  for your care. Our goal is always to provide you with excellent care. Hearing back from our patients is one way we can continue to improve our services. Please take a few minutes to complete the written survey that you may receive in the mail after your visit with us. Thank you!             Your Updated Medication List - Protect others around you: Learn how to safely use, store and throw away  your medicines at www.disposemymeds.org.          This list is accurate as of 3/9/18  8:39 AM.  Always use your most recent med list.                   Brand Name Dispense Instructions for use Diagnosis    artificial tears Oint ophthalmic ointment     1 g    Place 1 g into the right eye At Bedtime    Bell's palsy       gabapentin 300 MG capsule    NEURONTIN    30 capsule    Take 1 tablet (300 mg) every night for 3 days, then 1 tablet twice daily for 3 days, then 1 tablet three times daily    Herpes zoster without complication       predniSONE 20 MG tablet    DELTASONE    28 tablet    Take 4 tablets (80 mg) by mouth daily for 7 days        * valACYclovir 1000 mg tablet    VALTREX    21 tablet    Take 1 tablet (1,000 mg) by mouth 3 times daily    Herpes zoster without complication       * valACYclovir 1000 mg tablet    VALTREX    21 tablet    Take 1 tablet (1,000 mg) by mouth 3 times daily for 7 days        * Notice:  This list has 2 medication(s) that are the same as other medications prescribed for you. Read the directions carefully, and ask your doctor or other care provider to review them with you.

## 2018-03-09 NOTE — PROGRESS NOTES
SUBJECTIVE:   Lina Sanford is a 62 year old female who presents to clinic today for the following health issues:      Chief Complaint   Patient presents with     Numbness     follow up bell's palsy     Eye patches have not been effective to keep eye closed and are causing irritation. Has been trying to use tape instead but is struggling with it. Has been using Systane eye drops. The drops feel great when she uses them.  Yesterday was last day to take prednisone and Valtrex. Can feel a small improvement in numbness in lower part of face. Sleeping has been difficult. Only slept 4 hours last night. Is afraid to sleep because she is afraid her eye will dry out.     Problem list and histories reviewed & adjusted, as indicated.  Additional history: as documented    Current Outpatient Prescriptions   Medication Sig Dispense Refill     artificial tears OINT ophthalmic ointment Place 1 g into the right eye At Bedtime 1 g 1     predniSONE (DELTASONE) 20 MG tablet Take 4 tablets (80 mg) by mouth daily for 7 days (Patient not taking: Reported on 3/9/2018) 28 tablet 0     valACYclovir (VALTREX) 1000 mg tablet Take 1 tablet (1,000 mg) by mouth 3 times daily for 7 days (Patient not taking: Reported on 3/9/2018) 21 tablet 0     valACYclovir (VALTREX) 1000 mg tablet Take 1 tablet (1,000 mg) by mouth 3 times daily (Patient not taking: Reported on 3/5/2018) 21 tablet 0     gabapentin (NEURONTIN) 300 MG capsule Take 1 tablet (300 mg) every night for 3 days, then 1 tablet twice daily for 3 days, then 1 tablet three times daily (Patient not taking: Reported on 3/5/2018) 30 capsule 0     Allergies   Allergen Reactions     Nkda [No Known Drug Allergies]        Reviewed and updated as needed this visit by clinical staff  Tobacco  Allergies  Meds  Med Hx  Surg Hx  Fam Hx  Soc Hx      Reviewed and updated as needed this visit by Provider           Worried about right eye drying out and is not able. Using systane eye drops.    Drooling is getting some better.   Last dose of prednisone was yesterday   Last dose of valtrex was yesterday            ROS:  Review of Systems   Constitutional: Negative for chills, diaphoresis, fatigue and fever.   HENT: Positive for drooling (when eats but is some better). Negative for ear discharge, ear pain, hearing loss, rhinorrhea, sinus pressure and sore throat.    Eyes: Negative for discharge and itching.        Unable to close right eye all the way     Respiratory: Negative for cough, shortness of breath and wheezing.    Gastrointestinal: Negative for constipation, diarrhea and nausea.   Skin: Negative for rash.   Neurological: Positive for numbness (right side of face, gradually getting better). Negative for dizziness, light-headedness and headaches.         OBJECTIVE:     /70 (BP Location: Left arm, Patient Position: Sitting, Cuff Size: Adult Regular)  Pulse 64  Temp 97.7  F (36.5  C) (Tympanic)  Resp 12  Wt 125 lb 12.8 oz (57.1 kg)  LMP 06/12/2007  BMI 21.59 kg/m2  Body mass index is 21.59 kg/(m^2).  Physical Exam   Constitutional: She appears well-developed and well-nourished.   Cardiovascular: Normal rate and regular rhythm.    Pulmonary/Chest: Effort normal and breath sounds normal.   Neurological: She is alert.   Unable to blow out cheeks  Unable to close eye completely with blinking but is able to close completely with clenching.  Improvement in smile, blowing out cheeks and raising eyebrow.  Not completely resolved.   Skin: Skin is warm and dry.       ASSESSMENT/PLAN:   1. Bell's palsy  We will send prescription for eye ointment.  Encouraged to use cotton balls and tape over right eye  Has taken full course of prednisone and Valtrex, no further meds needed.  We will plan to follow-up in 2 weeks.  Continue to protect eye  May need therapy in the future.  - artificial tears OINT ophthalmic ointment; Place 1 g into the right eye At Bedtime  Dispense: 1 g; Refill: 1        Elen H.  UVALDO Huizar [facility] [address]

## 2018-03-09 NOTE — NURSING NOTE
"Chief Complaint   Patient presents with     Numbness     follow up bell's palsy       Initial /70 (BP Location: Left arm, Patient Position: Sitting, Cuff Size: Adult Regular)  Pulse 64  Temp 97.7  F (36.5  C) (Tympanic)  Resp 12  Wt 125 lb 12.8 oz (57.1 kg)  LMP 06/12/2007  BMI 21.59 kg/m2 Estimated body mass index is 21.59 kg/(m^2) as calculated from the following:    Height as of 3/2/18: 5' 4\" (1.626 m).    Weight as of this encounter: 125 lb 12.8 oz (57.1 kg).  Medication Reconciliation: complete     April LILIAN Ontiveros      "

## 2018-03-23 ENCOUNTER — OFFICE VISIT (OUTPATIENT)
Dept: FAMILY MEDICINE | Facility: CLINIC | Age: 63
End: 2018-03-23
Payer: COMMERCIAL

## 2018-03-23 VITALS
HEART RATE: 68 BPM | TEMPERATURE: 97.1 F | SYSTOLIC BLOOD PRESSURE: 110 MMHG | BODY MASS INDEX: 20.94 KG/M2 | RESPIRATION RATE: 12 BRPM | DIASTOLIC BLOOD PRESSURE: 68 MMHG | WEIGHT: 122 LBS

## 2018-03-23 DIAGNOSIS — Z12.39 BREAST CANCER SCREENING: ICD-10-CM

## 2018-03-23 DIAGNOSIS — G51.0 BELL PALSY: ICD-10-CM

## 2018-03-23 DIAGNOSIS — Z78.0 MENOPAUSE: Primary | ICD-10-CM

## 2018-03-23 PROCEDURE — 99213 OFFICE O/P EST LOW 20 MIN: CPT | Performed by: NURSE PRACTITIONER

## 2018-03-23 ASSESSMENT — ENCOUNTER SYMPTOMS
RHINORRHEA: 0
SINUS PRESSURE: 0
WHEEZING: 0
DIAPHORESIS: 0
COUGH: 0
EYE ITCHING: 0
DIARRHEA: 0
CHILLS: 0
FATIGUE: 0
SORE THROAT: 0
DIZZINESS: 0
EYE DISCHARGE: 0
HEADACHES: 0
CONSTIPATION: 0
LIGHT-HEADEDNESS: 0
SHORTNESS OF BREATH: 0
FEVER: 0
NAUSEA: 0

## 2018-03-23 ASSESSMENT — PAIN SCALES - GENERAL: PAINLEVEL: MILD PAIN (2)

## 2018-03-23 NOTE — NURSING NOTE
"Chief Complaint   Patient presents with     Numbness     Bell's Palsy Follow Up       Initial /68 (BP Location: Right arm, Patient Position: Sitting, Cuff Size: Adult Regular)  Pulse 68  Temp 97.1  F (36.2  C) (Tympanic)  Resp 12  Wt 122 lb (55.3 kg)  LMP 06/12/2007  BMI 20.94 kg/m2 Estimated body mass index is 20.94 kg/(m^2) as calculated from the following:    Height as of 3/2/18: 5' 4\" (1.626 m).    Weight as of this encounter: 122 lb (55.3 kg).  Medication Reconciliation: complete     April LILIAN Ontiveros      "

## 2018-03-23 NOTE — PROGRESS NOTES
"  SUBJECTIVE:   Lina Sanford is a 62 year old female who presents to clinic today for the following health issues:      Chief Complaint   Patient presents with     Numbness     Bell's Palsy Follow Up     Numbness is much improved but still not 100 percent. Is able to close eyes and no longer needs eye patches. Has not needed artificial tears for the past 3-4 days. Is having some mild pain to right side of face and neck. Right ear feels \"hollow\".     Problem list and histories reviewed & adjusted, as indicated.  Additional history: as documented    Current Outpatient Prescriptions   Medication Sig Dispense Refill     artificial tears OINT ophthalmic ointment Place 1 g into the right eye At Bedtime (Patient not taking: Reported on 3/23/2018) 1 g 1     valACYclovir (VALTREX) 1000 mg tablet Take 1 tablet (1,000 mg) by mouth 3 times daily (Patient not taking: Reported on 3/5/2018) 21 tablet 0     gabapentin (NEURONTIN) 300 MG capsule Take 1 tablet (300 mg) every night for 3 days, then 1 tablet twice daily for 3 days, then 1 tablet three times daily (Patient not taking: Reported on 3/5/2018) 30 capsule 0     Allergies   Allergen Reactions     Nkda [No Known Drug Allergies]        Reviewed and updated as needed this visit by clinical staff  Tobacco  Allergies  Meds  Med Hx  Surg Hx  Fam Hx  Soc Hx      Reviewed and updated as needed this visit by Provider          Here today for follow up on [place] Palsy. Having some increased sensation to back of head that is worse at night. Is able to raise eye brows, smile is better, is able to close eye on own. Right ear feels hollow and echo.     Needs to get set up for mammogram.  Does self breast exams without area of concern.  Last menstrual period was at least 10 years ago.  Is agreeable to bone density as well.    ROS:  Review of Systems   Constitutional: Negative for chills, diaphoresis, fatigue and fever.   HENT: Negative for ear discharge, ear pain, hearing loss, " rhinorrhea, sinus pressure and sore throat.         Right ear feels hollow     Eyes: Negative for discharge and itching.   Respiratory: Negative for cough, shortness of breath and wheezing.    Gastrointestinal: Negative for constipation, diarrhea and nausea.   Skin: Negative for rash.   Neurological: Negative for dizziness, light-headedness and headaches.        Occasional pain to right back of head           OBJECTIVE:     /68 (BP Location: Right arm, Patient Position: Sitting, Cuff Size: Adult Regular)  Pulse 68  Temp 97.1  F (36.2  C) (Tympanic)  Resp 12  Wt 122 lb (55.3 kg)  LMP 06/12/2007  BMI 20.94 kg/m2  Body mass index is 20.94 kg/(m^2).  Physical Exam   Constitutional: She appears well-developed.   HENT:   Right Ear: Tympanic membrane and external ear normal.   Left Ear: Tympanic membrane and external ear normal.   Nose: No mucosal edema or rhinorrhea.   Cardiovascular: Normal rate, regular rhythm and normal heart sounds.    Pulmonary/Chest: Effort normal and breath sounds normal.   Abdominal: Soft. Bowel sounds are normal.   Neurological: She is alert.   Able to close both eyes.  Able to blow out cheeks.  Does continue to have some right sided facial droop but edge of mouth does go upward with smile   Skin: Skin is warm and dry.   Psychiatric: She has a normal mood and affect.       ASSESSMENT/PLAN:   1. Menopause  Order placed, plans to call per self and set up  - DX Hip/Pelvis/Spine; Future    2. Breast cancer screening  Order placed, plans to call per self and set up  - MA Screening Digital Bilateral; Future    3. Bell palsy  Continues to improve  Follow up as needed           UVALDO Gay [facility]

## 2018-03-23 NOTE — MR AVS SNAPSHOT
After Visit Summary   3/23/2018    Lina Sanford    MRN: [id number]           Patient Information     Date Of Birth          1955        Visit Information        Provider Department      3/23/2018 1:00 PM Elen Huizar APRN [facility]        Today's Diagnoses     Menopause    -  1    Breast cancer screening        Bell palsy           Follow-ups after your visit        Future tests that were ordered for you today     Open Future Orders        Priority Expected Expires Ordered    MA Screening Digital Bilateral Routine  3/23/2019 3/23/2018    DX Hip/Pelvis/Spine Routine  3/23/2019 3/23/2018            Who to contact     Normal or non-critical lab and imaging results will be communicated to you by CNZZhart, letter or phone within 4 business days after the clinic has received the results. If you do not hear from us within 7 days, please contact the clinic through CNZZhart or phone. If you have a critical or abnormal lab result, we will notify you by phone as soon as possible.  Submit refill requests through [facility] or call your pharmacy and they will forward the refill request to us. Please allow 3 business days for your refill to be completed.          If you need to speak with a  for additional information , please call: [phone]           Additional Information About Your Visit        CNZZharChangeTip Information     [facility] gives you secure access to your electronic health record. If you see a primary care provider, you can also send messages to your care team and make appointments. If you have questions, please call your primary care clinic.  If you do not have a primary care provider, please call [phone] and they will assist you.        Care EveryWhere ID     This is your Care EveryWhere ID. This could be used by other organizations to access your [facility] medical records  [id number]        Your Vitals Were     Pulse Temperature Respirations Last  Period BMI (Body Mass Index)       68 97.1  F (36.2  C) (Tympanic) 12 06/12/2007 20.94 kg/m2        Blood Pressure from Last 3 Encounters:   03/23/18 110/68   03/09/18 120/70   03/05/18 136/80    Weight from Last 3 Encounters:   03/23/18 122 lb (55.3 kg)   03/09/18 125 lb 12.8 oz (57.1 kg)   03/05/18 126 lb 3.2 oz (57.2 kg)               Primary Care Provider Office Phone # Fax #    Amanda Patel  [phone] [address] [place] [address]        Equal Access to Services     ANA AYALA : Monika Castañeda, wamiguel hewitt, qaybta kaalmada doreen, khadijah joel. So [facility] [place] [facility] [phone].    ATENCIÓN: Si habla español, tiene a victoria disposición servicios gratuitos de asistencia lingüística. Llame al [phone].    We comply with applicable federal civil rights laws and [place] laws. We do not discriminate on the basis of race, color, national origin, age, disability, sex, sexual orientation, or gender identity.            Thank you!     Thank you for choosing [facility] [place]  for your care. Our goal is always to provide you with excellent care. Hearing back from our patients is one way we can continue to improve our services. Please take a few minutes to complete the written survey that you may receive in the mail after your visit with us. Thank you!             Your Updated Medication List - Protect others around you: Learn how to safely use, store and throw away your medicines at www.disposemymeds.org.          This list is accurate as of 3/23/18  1:18 PM.  Always use your most recent med list.                   Brand Name Dispense Instructions for use Diagnosis    artificial tears Oint ophthalmic ointment     1 g    Place 1 g into the right eye At Bedtime    Bell's palsy       gabapentin 300 MG capsule    NEURONTIN    30 capsule    Take 1 tablet (300 mg) every night for 3 days, then 1 tablet twice daily for 3 days, then 1 tablet  three times daily    Herpes zoster without complication       valACYclovir 1000 mg tablet    VALTREX    21 tablet    Take 1 tablet (1,000 mg) by mouth 3 times daily    Herpes zoster without complication

## 2018-06-13 ENCOUNTER — TELEPHONE (OUTPATIENT)
Dept: FAMILY MEDICINE | Facility: CLINIC | Age: 63
End: 2018-06-13

## 2018-06-13 NOTE — TELEPHONE ENCOUNTER
Panel Management Review      Patient has the following on her problem list: None      Composite cancer screening  Chart review shows that this patient is due/due soon for the following Mammogram  Summary:    Patient is due/failing the following:   HIV screen and MAMMOGRAM    Action needed:   Needs to schedule mammogram. Ordered 3/23/18    Type of outreach:    Sent Dynasil message.    Questions for provider review:    None                                                                                                                                    April LILIAN Ontiveros       Chart routed to none .

## 2018-12-07 ENCOUNTER — TELEPHONE (OUTPATIENT)
Dept: FAMILY MEDICINE | Facility: CLINIC | Age: 63
End: 2018-12-07

## 2019-01-15 ENCOUNTER — HOSPITAL ENCOUNTER (OUTPATIENT)
Dept: MAMMOGRAPHY | Facility: CLINIC | Age: 64
Discharge: HOME | End: 2019-01-15
Attending: NURSE PRACTITIONER | Admitting: NURSE PRACTITIONER
Payer: COMMERCIAL

## 2019-01-15 DIAGNOSIS — Z12.39 BREAST CANCER SCREENING: ICD-10-CM

## 2019-01-15 PROCEDURE — 77067 SCR MAMMO BI INCL CAD: CPT

## 2019-01-15 NOTE — LETTER
January 16, 2019      Lina Sanford  [address] [address]        Dear ,    We are writing to inform you of your test results.    Your mammogram is normal. Plan to repeat in 1 year.    Resulted Orders   MA Screening Digital Bilateral    Narrative    Examination: Bilateral digital screening mammography with computer  aided detection, 1/15/2019 8:41 AM.    Comparison: 1/13/2016, 12/23/2011, 12/10/2009, 11/6/2007    History: No current breast concerns.    BREAST DENSITY: Extremely dense.    COMMENTS:  No suspicious finding.      Impression    IMPRESSION: BI-RADS CATEGORY: 1 -  NEGATIVE.    RECOMMENDED FOLLOW-UP: Annual Mammography.      The patient will be notified of the results.     PARAM ROBERTS MD       If you have any questions or concerns, please call the clinic at the number listed above.       Sincerely,        [facility] MAMMO ROOM 2

## 2019-10-26 ENCOUNTER — OFFICE VISIT (OUTPATIENT)
Dept: ORTHOPEDICS | Facility: CLINIC | Age: 64
End: 2019-10-26
Payer: COMMERCIAL

## 2019-10-26 ENCOUNTER — ANCILLARY PROCEDURE (OUTPATIENT)
Dept: GENERAL RADIOLOGY | Facility: CLINIC | Age: 64
End: 2019-10-26
Attending: FAMILY MEDICINE
Payer: COMMERCIAL

## 2019-10-26 DIAGNOSIS — M25.562 LEFT KNEE PAIN: ICD-10-CM

## 2019-10-26 DIAGNOSIS — M25.562 ACUTE PAIN OF LEFT KNEE: Primary | ICD-10-CM

## 2019-10-26 PROCEDURE — 99204 OFFICE O/P NEW MOD 45 MIN: CPT | Performed by: FAMILY MEDICINE

## 2019-10-26 PROCEDURE — 73562 X-RAY EXAM OF KNEE 3: CPT

## 2019-10-26 NOTE — PROGRESS NOTES
ASSESSMENT & PLAN  Lina was seen today for pain.    Diagnoses and all orders for this visit:    Acute pain of left knee  -     XR Knee Standing AP Bilat [place] Bilat Lat Left; Future  -     order for DME; Equipment being ordered: small horseshoe hinged knee brace      Patient is a 64 year old female presenting for evaluation of   Chief Complaint   Patient presents with     Left Knee - Pain      # Acute Left Knee Pain: Notable after a twisting injury yesterday in posterior lateral knee. Pt reporting pain and ROM improving but still significant.  Pain at posterior lateral region with pos dial test at 30 deg with no ligamentous laxity.  Differential meniscal injury vs posterior lateral corner vs knee sprain vs calf strain.  Counseled patient on nature of condition and treatment options.  Given this plan as below, follow-up 1 week  Treatment: Hinged knee brace, crutches  Physical Therapy encourage ROM, if not improved can refer for formal PT  Injection none  Medications  Limited NSAIDs/Tylenol    Concerning signs/sx that would warrant urgent evaluation were discussed.  All questions were answered, patient understands and agrees with plan.      Return in about 1 week (around 11/2/2019).    -----    SUBJECTIVE  Lina Sanford is a/an 64 year old female who is seen as a self referral, AIC for evaluation of left knee pain. The patient is seen with their .    Onset: 10/25/19, 1 day(s) ago. Patient describes injury as running for ball planted on left foot. Pt reported felt a pop all over the knee.  Pt has pain with some radiation to the calf.  No previous injury  Likes to play tennis    Location of Pain: left posteriolateral knee   Rating of Pain at worst: 7/10  Rating of Pain Currently: 0/10  Worsened by: twist, knee extension, knee flexion   Better with: rest, crutches, Hydrocodone, Ibuprofen   Treatments tried: as above  Associated symptoms: feeling of instability  Orthopedic history: NO  Relevant surgical  history: NO  History reviewed. No pertinent past medical history.  Social History     Socioeconomic History     Marital status:      Spouse name: Not on file     Number of children: Not on file     Years of education: Not on file     Highest education level: Not on file   Occupational History     Not on file   Social Needs     Financial resource strain: Not on file     Food insecurity:     Worry: Not on file     Inability: Not on file     Transportation needs:     Medical: Not on file     Non-medical: Not on file   Tobacco Use     Smoking status: Never Smoker     Smokeless tobacco: Never Used   Substance and Sexual Activity     Alcohol use: Yes     Comment: occasionally approx 2 drinks per week     Drug use: No     Sexual activity: Yes     Partners: Male     Birth control/protection: Surgical     Comment: vasectomy   Lifestyle     Physical activity:     Days per week: Not on file     Minutes per session: Not on file     Stress: Not on file   Relationships     Social connections:     Talks on phone: Not on file     Gets together: Not on file     Attends Denominational service: Not on file     Active member of club or organization: Not on file     Attends meetings of clubs or organizations: Not on file     Relationship status: Not on file     Intimate partner violence:     Fear of current or ex partner: Not on file     Emotionally abused: Not on file     Physically abused: Not on file     Forced sexual activity: Not on file   Other Topics Concern     Parent/sibling w/ CABG, MI or angioplasty before 65F 55M? No   Social History Narrative     Not on file         Patient's past medical, surgical, social, and family histories were reviewed today and no changes are noted.  Sister had torn ACL    REVIEW OF SYSTEMS:  10 point ROS is negative other than symptoms noted above in HPI, Past Medical History or as stated below  Constitutional: NEGATIVE for fever, chills, change in weight  Skin: NEGATIVE for worrisome rashes,  moles or lesions  GI/: NEGATIVE for bowel or bladder changes  Neuro: NEGATIVE for weakness, dizziness or paresthesias    OBJECTIVE:  BP (P) 102/64   LMP 06/12/2007    General: healthy, alert and in no distress  HEENT: no scleral icterus or conjunctival erythema  Skin: no suspicious lesions or rash. No jaundice.  CV: no pedal edema  Resp: normal respiratory effort without conversational dyspnea   Psych: normal mood and affect  Gait: normal steady gait with appropriate coordination and balance  Neuro: Normal light sensory exam of lower extremity  MSK:  LEFT KNEE  Inspection:    normal alignment  Palpation:    Tender about the popliteal region mainly laterally. Remainder of bony and ligamentous landmarks are nontender.    Small effusion is present    Patellofemoral crepitus is Absent  Range of Motion:     00 extension to 1350 flexion pain with end flexion  Strength:    Quadriceps 5/5, hamstrings 5-/5, painful, gastrocsoleus 5/5 and tibialis anterior 5/5    Extensor mechanism intact  Special Tests:    Positive: Kip's, Dial test at 30 deg    Negative: MCL/valgus stress (0 & 30 deg), LCL/varus stress (0 & 30 deg), Lachman's, anterior drawer, posterior drawer    Independent visualization of the below image:  Recent Results (from the past 24 hour(s))   XR Knee Standing AP Bilat [place] Bilat Lat Left    Narrative    KNEE STANDING AP BILATERAL [place] BILATERAL LATERAL LEFT 10/26/2019  9:01 AM     HISTORY: Left knee pain    COMPARISON: None.      Impression    IMPRESSION: Three views of the left knee show no bony or soft tissue  abnormality. Possible minimal joint space effusion. Two views of the  right knee are included and show early medial joint space narrowing  and no other abnormality.     KNEE STANDING AP BILATERAL [place] BILATERAL LATERAL LEFT 10/26/2019  9:01 AM      HISTORY: Left knee pain     COMPARISON: None.                                                                    IMPRESSION: Three views of the  left knee show no bony or soft tissue  abnormality. Possible minimal joint space effusion. Two views of the  right knee are included and show early medial joint space narrowing  and no other abnormality.    I  ordered, visualized and reviewed these images with the patient  Mild joint space in medial side with small effusion      Myron Rosenthal MD, [facility]

## 2019-10-26 NOTE — LETTER
10/26/2019         RE: Lina Sanford  [address] [address]        Dear Colleague,    Thank you for referring your patient, Lina Sanford, to the [facility] [place]. Please see a copy of my visit note below.    ASSESSMENT & PLAN  Lina was seen today for pain.    Diagnoses and all orders for this visit:    Acute pain of left knee  -     XR Knee Standing AP Bilat [place] Bilat Lat Left; Future  -     order for DME; Equipment being ordered: small horseshoe hinged knee brace      Patient is a 64 year old female presenting for evaluation of   Chief Complaint   Patient presents with     Left Knee - Pain      # Acute Left Knee Pain: Notable after a twisting injury yesterday in posterior lateral knee. Pt reporting pain and ROM improving but still significant.  Pain at posterior lateral region with pos dial test at 30 deg with no ligamentous laxity.  Differential meniscal injury vs posterior lateral corner vs knee sprain vs calf strain.  Counseled patient on nature of condition and treatment options.  Given this plan as below, follow-up 1 week  Treatment: Hinged knee brace, crutches  Physical Therapy encourage ROM, if not improved can refer for formal PT  Injection none  Medications  Limited NSAIDs/Tylenol    Concerning signs/sx that would warrant urgent evaluation were discussed.  All questions were answered, patient understands and agrees with plan.      Return in about 1 week (around 11/2/2019).    -----    SUBJECTIVE  Lina Sanford is a/an 64 year old female who is seen as a self referral, AIC for evaluation of left knee pain. The patient is seen with their .    Onset: 10/25/19, 1 day(s) ago. Patient describes injury as running for ball planted on left foot. Pt reported felt a pop all over the knee.  Pt has pain with some radiation to the calf.  No previous injury  Likes to play tennis    Location of Pain: left posteriolateral knee   Rating of Pain at  worst: 7/10  Rating of Pain Currently: 0/10  Worsened by: twist, knee extension, knee flexion   Better with: rest, crutches, Hydrocodone, Ibuprofen   Treatments tried: as above  Associated symptoms: feeling of instability  Orthopedic history: NO  Relevant surgical history: NO  History reviewed. No pertinent past medical history.  Social History     Socioeconomic History     Marital status:      Spouse name: Not on file     Number of children: Not on file     Years of education: Not on file     Highest education level: Not on file   Occupational History     Not on file   Social Needs     Financial resource strain: Not on file     Food insecurity:     Worry: Not on file     Inability: Not on file     Transportation needs:     Medical: Not on file     Non-medical: Not on file   Tobacco Use     Smoking status: Never Smoker     Smokeless tobacco: Never Used   Substance and Sexual Activity     Alcohol use: Yes     Comment: occasionally approx 2 drinks per week     Drug use: No     Sexual activity: Yes     Partners: Male     Birth control/protection: Surgical     Comment: vasectomy   Lifestyle     Physical activity:     Days per week: Not on file     Minutes per session: Not on file     Stress: Not on file   Relationships     Social connections:     Talks on phone: Not on file     Gets together: Not on file     Attends Anglican service: Not on file     Active member of club or organization: Not on file     Attends meetings of clubs or organizations: Not on file     Relationship status: Not on file     Intimate partner violence:     Fear of current or ex partner: Not on file     Emotionally abused: Not on file     Physically abused: Not on file     Forced sexual activity: Not on file   Other Topics Concern     Parent/sibling w/ CABG, MI or angioplasty before 65F 55M? No   Social History Narrative     Not on file         Patient's past medical, surgical, social, and family histories were reviewed today and no changes  are noted.  Sister had torn ACL    REVIEW OF SYSTEMS:  10 point ROS is negative other than symptoms noted above in HPI, Past Medical History or as stated below  Constitutional: NEGATIVE for fever, chills, change in weight  Skin: NEGATIVE for worrisome rashes, moles or lesions  GI/: NEGATIVE for bowel or bladder changes  Neuro: NEGATIVE for weakness, dizziness or paresthesias    OBJECTIVE:  BP (P) 102/64   LMP 06/12/2007    General: healthy, alert and in no distress  HEENT: no scleral icterus or conjunctival erythema  Skin: no suspicious lesions or rash. No jaundice.  CV: no pedal edema  Resp: normal respiratory effort without conversational dyspnea   Psych: normal mood and affect  Gait: normal steady gait with appropriate coordination and balance  Neuro: Normal light sensory exam of lower extremity  MSK:  LEFT KNEE  Inspection:    normal alignment  Palpation:    Tender about the popliteal region mainly laterally. Remainder of bony and ligamentous landmarks are nontender.    Small effusion is present    Patellofemoral crepitus is Absent  Range of Motion:     00 extension to 1350 flexion pain with end flexion  Strength:    Quadriceps 5/5, hamstrings 5-/5, painful, gastrocsoleus 5/5 and tibialis anterior 5/5    Extensor mechanism intact  Special Tests:    Positive: Kip's, Dial test at 30 deg    Negative: MCL/valgus stress (0 & 30 deg), LCL/varus stress (0 & 30 deg), Lachman's, anterior drawer, posterior drawer    Independent visualization of the below image:  Recent Results (from the past 24 hour(s))   XR Knee Standing AP Bilat [place] Bilat Lat Left    Narrative    KNEE STANDING AP BILATERAL [place] BILATERAL LATERAL LEFT 10/26/2019  9:01 AM     HISTORY: Left knee pain    COMPARISON: None.      Impression    IMPRESSION: Three views of the left knee show no bony or soft tissue  abnormality. Possible minimal joint space effusion. Two views of the  right knee are included and show early medial joint space  narrowing  and no other abnormality.     KNEE STANDING AP BILATERAL [place] BILATERAL LATERAL LEFT 10/26/2019  9:01 AM      HISTORY: Left knee pain     COMPARISON: None.                                                                    IMPRESSION: Three views of the left knee show no bony or soft tissue  abnormality. Possible minimal joint space effusion. Two views of the  right knee are included and show early medial joint space narrowing  and no other abnormality.    I  ordered, visualized and reviewed these images with the patient  Mild joint space in medial side with small effusion      Myron Rosenthal MD, [facility]      Again, thank you for allowing me to participate in the care of your patient.        Sincerely,        Myron Rosenthal MD

## 2019-10-26 NOTE — PATIENT INSTRUCTIONS
Diagnosis: Left Knee injury  Image Findings: negative xrays  Treatment: Hinged knee brace, crutches as needed  Medications: Limited tylenol/ibuprofen for pain for 1-2 weeks  Follow-up: 1 week for repeat evaluation    It was great seeing you today!    Myron Rosenthal

## 2019-10-31 ENCOUNTER — OFFICE VISIT (OUTPATIENT)
Dept: ORTHOPEDICS | Facility: CLINIC | Age: 64
End: 2019-10-31
Payer: COMMERCIAL

## 2019-10-31 VITALS — DIASTOLIC BLOOD PRESSURE: 72 MMHG | SYSTOLIC BLOOD PRESSURE: 116 MMHG

## 2019-10-31 DIAGNOSIS — M25.562 ACUTE PAIN OF LEFT KNEE: Primary | ICD-10-CM

## 2019-10-31 PROCEDURE — 99213 OFFICE O/P EST LOW 20 MIN: CPT | Performed by: FAMILY MEDICINE

## 2019-10-31 NOTE — PROGRESS NOTES
ASSESSMENT & PLAN  Lina was seen today for pain.    Diagnoses and all orders for this visit:    Acute pain of left knee      Patient is a 64 year old female presenting for evaluation of   Chief Complaint   Patient presents with     Left Knee - Pain      # Acute Left Knee Pain: Notable after a twisting injury on 10/25/19 in posterior lateral knee. Pt reporting improving pain and ROM improving about 60-70%.  Pain at posterior lateral region with pos dial test at 30 deg with no ligamentous laxity.  Differential meniscal injury vs posterior lateral corner vs knee sprain vs calf strain.  Pain significantly improved at this time.  Given this may have just been a knee sprain.  Plan to tx as below and f/u PRN.  Pt understands and agrees with the plan.    Treatment: activities as tolerated  Physical Therapy HEP given, if not improved can refer for formal PT  Injection none  Medications  Limited NSAIDs/Tylenol    Concerning signs/sx that would warrant urgent evaluation were discussed.  All questions were answered, patient understands and agrees with plan.      Return if symptoms worsen or fail to improve.    -----    SUBJECTIVE  Lina Sanford is a/an 64 year old female who is seen as a self referral, AIC for evaluation of left knee pain. The patient is seen with their .    Onset: 10/25/19, 1 day(s) ago. Patient describes injury as running for ball planted on left foot. Pt reported felt a pop all over the knee.  Pt has pain with some radiation to the calf.  No previous injury  Likes to play tennis    Location of Pain: left posteriolateral knee   Rating of Pain at worst: 7/10  Rating of Pain Currently: 0/10  Worsened by: twist, knee extension, knee flexion   Better with: rest, crutches, Hydrocodone, Ibuprofen   Treatments tried: as above  Associated symptoms: feeling of instability  Orthopedic history: NO  Relevant surgical history: NO    Interim History - October 31, 2019  Since last visit on 10/26/2019 patient has  significantly improved pain.  She is no longer using crutches or wearing her knee brace.  She states she has limited ROM with end range flexion and extension.  No interim injury.   Pt can go up stairs, down stairs is painful. Pt overall feels she is about 60-70% improved     History reviewed. No pertinent past medical history.  Social History     Socioeconomic History     Marital status:      Spouse name: Not on file     Number of children: Not on file     Years of education: Not on file     Highest education level: Not on file   Occupational History     Not on file   Social Needs     Financial resource strain: Not on file     Food insecurity:     Worry: Not on file     Inability: Not on file     Transportation needs:     Medical: Not on file     Non-medical: Not on file   Tobacco Use     Smoking status: Never Smoker     Smokeless tobacco: Never Used   Substance and Sexual Activity     Alcohol use: Yes     Comment: occasionally approx 2 drinks per week     Drug use: No     Sexual activity: Yes     Partners: Male     Birth control/protection: Surgical     Comment: vasectomy   Lifestyle     Physical activity:     Days per week: Not on file     Minutes per session: Not on file     Stress: Not on file   Relationships     Social connections:     Talks on phone: Not on file     Gets together: Not on file     Attends [employer] service: Not on file     Active member of club or organization: Not on file     Attends meetings of clubs or organizations: Not on file     Relationship status: Not on file     Intimate partner violence:     Fear of current or ex partner: Not on file     Emotionally abused: Not on file     Physically abused: Not on file     Forced sexual activity: Not on file   Other Topics Concern     Parent/sibling w/ CABG, MI or angioplasty before 65F 55M? No   Social History Narrative     Not on file         Patient's past medical, surgical, social, and family histories were reviewed today and no changes  are noted.  Sister had torn ACL    REVIEW OF SYSTEMS:  10 point ROS is negative other than symptoms noted above in HPI, Past Medical History or as stated below  Constitutional: NEGATIVE for fever, chills, change in weight  Skin: NEGATIVE for worrisome rashes, moles or lesions  GI/: NEGATIVE for bowel or bladder changes  Neuro: NEGATIVE for weakness, dizziness or paresthesias    OBJECTIVE:  /72   LMP 06/12/2007    General: healthy, alert and in no distress  HEENT: no scleral icterus or conjunctival erythema  Skin: no suspicious lesions or rash. No jaundice.  CV: no pedal edema  Resp: normal respiratory effort without conversational dyspnea   Psych: normal mood and affect  Gait: normal steady gait with appropriate coordination and balance  Neuro: Normal light sensory exam of lower extremity  MSK:  LEFT KNEE  Inspection:    normal alignment  Palpation:   Very mild TTP about the popliteal region mainly laterally. Remainder of bony and ligamentous landmarks are nontender.    Small effusion is present    Patellofemoral crepitus is Absent  Range of Motion:     00 extension to 1350 flexion mild pain with end flexion  Strength:    Quadriceps 5/5, hamstrings 5-/5, painful, gastrocsoleus 5/5 and tibialis anterior 5/5    Extensor mechanism intact  Special Tests:    Positive: Mild pain with  Kip's, Dial test at 30 deg    Negative: MCL/valgus stress (0 & 30 deg), LCL/varus stress (0 & 30 deg), Lachman's, anterior drawer, posterior drawer    Independent visualization of the below image:  No results found for this or any previous visit (from the past 24 hour(s)).  KNEE STANDING AP BILATERAL SUNRISE BILATERAL LATERAL LEFT 10/26/2019  9:01 AM      HISTORY: Left knee pain     COMPARISON: None.                                                                    IMPRESSION: Three views of the left knee show no bony or soft tissue  abnormality. Possible minimal joint space effusion. Two views of the  right knee are included  and show early medial joint space narrowing  and no other abnormality.    I  ordered, visualized and reviewed these images with the patient  Mild joint space in medial side with small effusion      Myron Rosenthal MD, [facility]

## 2019-10-31 NOTE — LETTER
10/31/2019         RE: Lina Sanford  [address] [id number]        Dear Colleague,    Thank you for referring your patient, Lina Sanford, to the [facility]. Please see a copy of my visit note below.    ASSESSMENT & PLAN  Lina was seen today for pain.    Diagnoses and all orders for this visit:    Acute pain of left knee      Patient is a 64 year old female presenting for evaluation of   Chief Complaint   Patient presents with     Left Knee - Pain      # Acute Left Knee Pain: Notable after a twisting injury on 10/25/19 in posterior lateral knee. Pt reporting improving pain and ROM improving about 60-70%.  Pain at posterior lateral region with pos dial test at 30 deg with no ligamentous laxity.  Differential meniscal injury vs posterior lateral corner vs knee sprain vs calf strain.  Pain significantly improved at this time.  Given this may have just been a knee sprain.  Plan to tx as below and f/u PRN.  Pt understands and agrees with the plan.    Treatment: activities as tolerated  Physical Therapy HEP given, if not improved can refer for formal PT  Injection none  Medications  Limited NSAIDs/Tylenol    Concerning signs/sx that would warrant urgent evaluation were discussed.  All questions were answered, patient understands and agrees with plan.      Return if symptoms worsen or fail to improve.    -----    SUBJECTIVE  Lina Sanford is a/an 64 year old female who is seen as a self referral, AIC for evaluation of left knee pain. The patient is seen with their .    Onset: 10/25/19, 1 day(s) ago. Patient describes injury as running for ball planted on left foot. Pt reported felt a pop all over the knee.  Pt has pain with some radiation to the calf.  No previous injury  Likes to play tennis    Location of Pain: left posteriolateral knee   Rating of Pain at worst: 7/10  Rating of Pain Currently: 0/10  Worsened by: twist, knee extension, knee flexion    Better with: rest, crutches, Hydrocodone, Ibuprofen   Treatments tried: as above  Associated symptoms: feeling of instability  Orthopedic history: NO  Relevant surgical history: NO    Interim History - October 31, 2019  Since last visit on 10/26/2019 patient has significantly improved pain.  She is no longer using crutches or wearing her knee brace.  She states she has limited ROM with end range flexion and extension.  No interim injury.   Pt can go up stairs, down stairs is painful. Pt overall feels she is about 60-70% improved     History reviewed. No pertinent past medical history.  Social History     Socioeconomic History     Marital status:      Spouse name: Not on file     Number of children: Not on file     Years of education: Not on file     Highest education level: Not on file   Occupational History     Not on file   Social Needs     Financial resource strain: Not on file     Food insecurity:     Worry: Not on file     Inability: Not on file     Transportation needs:     Medical: Not on file     Non-medical: Not on file   Tobacco Use     Smoking status: Never Smoker     Smokeless tobacco: Never Used   Substance and Sexual Activity     Alcohol use: Yes     Comment: occasionally approx 2 drinks per week     Drug use: No     Sexual activity: Yes     Partners: Male     Birth control/protection: Surgical     Comment: vasectomy   Lifestyle     Physical activity:     Days per week: Not on file     Minutes per session: Not on file     Stress: Not on file   Relationships     Social connections:     Talks on phone: Not on file     Gets together: Not on file     Attends Scientologist service: Not on file     Active member of club or organization: Not on file     Attends meetings of clubs or organizations: Not on file     Relationship status: Not on file     Intimate partner violence:     Fear of current or ex partner: Not on file     Emotionally abused: Not on file     Physically abused: Not on file     Forced  sexual activity: Not on file   Other Topics Concern     Parent/sibling w/ CABG, MI or angioplasty before 65F 55M? No   Social History Narrative     Not on file         Patient's past medical, surgical, social, and family histories were reviewed today and no changes are noted.  Sister had torn ACL    REVIEW OF SYSTEMS:  10 point ROS is negative other than symptoms noted above in HPI, Past Medical History or as stated below  Constitutional: NEGATIVE for fever, chills, change in weight  Skin: NEGATIVE for worrisome rashes, moles or lesions  GI/: NEGATIVE for bowel or bladder changes  Neuro: NEGATIVE for weakness, dizziness or paresthesias    OBJECTIVE:  /73   LMP 06/12/2007    General: healthy, alert and in no distress  HEENT: no scleral icterus or conjunctival erythema  Skin: no suspicious lesions or rash. No jaundice.  CV: no pedal edema  Resp: normal respiratory effort without conversational dyspnea   Psych: normal mood and affect  Gait: normal steady gait with appropriate coordination and balance  Neuro: Normal light sensory exam of lower extremity  MSK:  LEFT KNEE  Inspection:    normal alignment  Palpation:   Very mild TTP about the popliteal region mainly laterally. Remainder of bony and ligamentous landmarks are nontender.    Small effusion is present    Patellofemoral crepitus is Absent  Range of Motion:     00 extension to 1350 flexion mild pain with end flexion  Strength:    Quadriceps 5/5, hamstrings 5-/5, painful, gastrocsoleus 5/5 and tibialis anterior 5/5    Extensor mechanism intact  Special Tests:    Positive: Mild pain with  Kip's, Dial test at 30 deg    Negative: MCL/valgus stress (0 & 30 deg), LCL/varus stress (0 & 30 deg), Lachman's, anterior drawer, posterior drawer    Independent visualization of the below image:  No results found for this or any previous visit (from the past 24 hour(s)).  KNEE STANDING AP BILATERAL SUNRISE BILATERAL LATERAL LEFT 10/26/2019  9:01 AM      HISTORY:  Left knee pain     COMPARISON: None.                                                                    IMPRESSION: Three views of the left knee show no bony or soft tissue  abnormality. Possible minimal joint space effusion. Two views of the  right knee are included and show early medial joint space narrowing  and no other abnormality.    I  ordered, visualized and reviewed these images with the patient  Mild joint space in medial side with small effusion      Myron Rosenthal MD, [facility]      Again, thank you for allowing me to participate in the care of your patient.        Sincerely,        Myron Rosenthal MD

## 2019-12-12 ENCOUNTER — OFFICE VISIT (OUTPATIENT)
Dept: ORTHOPEDICS | Facility: CLINIC | Age: 64
End: 2019-12-12
Payer: COMMERCIAL

## 2019-12-12 VITALS — SYSTOLIC BLOOD PRESSURE: 116 MMHG | DIASTOLIC BLOOD PRESSURE: 64 MMHG

## 2019-12-12 DIAGNOSIS — M25.562 ACUTE PAIN OF LEFT KNEE: Primary | ICD-10-CM

## 2019-12-12 PROCEDURE — 99213 OFFICE O/P EST LOW 20 MIN: CPT | Performed by: FAMILY MEDICINE

## 2019-12-12 NOTE — PROGRESS NOTES
ASSESSMENT & PLAN  Lina was seen today for pain.    Diagnoses and all orders for this visit:    Acute pain of left knee  -     PHYSICAL THERAPY REFERRAL (Internal); Future      Patient is a 64 year old female presenting for evaluation of   Chief Complaint   Patient presents with     Left Knee - Pain      # Acute Left Knee Pain: Notable after a twisting injury on 10/25/19 in posterior lateral knee. Pt reporting improving pain and ROM improving about 60-70%.  Pain at posterior lateral region with previous exam showing pos dial test at 30 deg with no ligamentous laxity.  Differential meniscal injury vs posterior lateral corner vs knee sprain vs calf strain.  Pain improved but still persisting.  Given this plan to treat as below and f/u if not improved.  Pt understands and agrees with the plan.    Treatment: activities as tolerated  Physical Therapy PT referred   Injection none  Medications  Limited NSAIDs/Tylenol    Concerning signs/sx that would warrant urgent evaluation were discussed.  All questions were answered, patient understands and agrees with plan.      Return if symptoms worsen or fail to improve.    -----    SUBJECTIVE  Lina Sanford is a/an 64 year old female who is seen as a self referral, AIC for evaluation of left knee pain. The patient is seen with their .    Onset: 10/25/19, 1 day(s) ago. Patient describes injury as running for ball planted on left foot. Pt reported felt a pop all over the knee.  Pt has pain with some radiation to the calf.  No previous injury  Likes to play tennis    Location of Pain: left posteriolateral knee   Rating of Pain at worst: 7/10  Rating of Pain Currently: 0/10  Worsened by: twist, knee extension, knee flexion   Better with: rest, crutches, Hydrocodone, Ibuprofen   Treatments tried: as above  Associated symptoms: feeling of instability  Orthopedic history: NO  Relevant surgical history: NO    Interim History - October 31, 2019  Since last visit on 10/26/2019  patient has significantly improved pain.  She is no longer using crutches or wearing her knee brace.  She states she has limited ROM with end range flexion and extension.  No interim injury.   Pt can go up stairs, down stairs is painful. Pt overall feels she is about 60-70% improved     Interim History - December 12, 2019  Since last visit on 10/31/2019 patient has waxing and wanning left knee pain.  States that pain is different everyday.  No interim injury.  Pain with stairs, does feel swollen.  Pain with sleeping.  No calf swelling, no HO DVTs, pt went to [place] two weeks after injury.      History reviewed. No pertinent past medical history.  Social History     Socioeconomic History     Marital status:      Spouse name: Not on file     Number of children: Not on file     Years of education: Not on file     Highest education level: Not on file   Occupational History     Not on file   Social Needs     Financial resource strain: Not on file     Food insecurity:     Worry: Not on file     Inability: Not on file     Transportation needs:     Medical: Not on file     Non-medical: Not on file   Tobacco Use     Smoking status: Never Smoker     Smokeless tobacco: Never Used   Substance and Sexual Activity     Alcohol use: Yes     Comment: occasionally approx 2 drinks per week     Drug use: No     Sexual activity: Yes     Partners: Male     Birth control/protection: Surgical     Comment: vasectomy   Lifestyle     Physical activity:     Days per week: Not on file     Minutes per session: Not on file     Stress: Not on file   Relationships     Social connections:     Talks on phone: Not on file     Gets together: Not on file     Attends Yazidi service: Not on file     Active member of club or organization: Not on file     Attends meetings of clubs or organizations: Not on file     Relationship status: Not on file     Intimate partner violence:     Fear of current or ex partner: Not on file     Emotionally abused: Not  on file     Physically abused: Not on file     Forced sexual activity: Not on file   Other Topics Concern     Parent/sibling w/ CABG, MI or angioplasty before 65F 55M? No   Social History Narrative     Not on file         Patient's past medical, surgical, social, and family histories were reviewed today and no changes are noted.  Sister had torn ACL    REVIEW OF SYSTEMS:  10 point ROS is negative other than symptoms noted above in HPI, Past Medical History or as stated below  Constitutional: NEGATIVE for fever, chills, change in weight  Skin: NEGATIVE for worrisome rashes, moles or lesions  GI/: NEGATIVE for bowel or bladder changes  Neuro: NEGATIVE for weakness, dizziness or paresthesias    OBJECTIVE:  /65   LMP 06/12/2007    General: healthy, alert and in no distress  HEENT: no scleral icterus or conjunctival erythema  Skin: no suspicious lesions or rash. No jaundice.  CV: no pedal edema  Resp: normal respiratory effort without conversational dyspnea   Psych: normal mood and affect  Gait: normal steady gait with appropriate coordination and balance  Neuro: Normal light sensory exam of lower extremity  MSK:  LEFT KNEE  Inspection:    normal alignment  Palpation:   Very mild TTP about the popliteal region mainly laterally. Remainder of bony and ligamentous landmarks are nontender.    Small effusion is present    Patellofemoral crepitus is Absent  Range of Motion:     00 extension to 1350 flexion mild pain with end flexion  Strength:    Quadriceps 5/5, hamstrings 5-/5, painful, gastrocsoleus 5/5 and tibialis anterior 5/5    Extensor mechanism intact  Special Tests:    Positive: Mild pain with  Kip's     Negative: MCL/valgus stress (0 & 30 deg), LCL/varus stress (0 & 30 deg), Lachman's, anterior drawer, posterior drawer    Independent visualization of the below image:  No results found for this or any previous visit (from the past 24 hour(s)).  KNEE STANDING AP BILATERAL SUNRISE BILATERAL LATERAL LEFT  10/26/2019  9:01 AM      HISTORY: Left knee pain     COMPARISON: None.                                                                    IMPRESSION: Three views of the left knee show no bony or soft tissue  abnormality. Possible minimal joint space effusion. Two views of the  right knee are included and show early medial joint space narrowing  and no other abnormality.    I  ordered, visualized and reviewed these images with the patient  Mild joint space in medial side with small effusion      Myron Rosenthal MD, CAM  [facility]

## 2019-12-12 NOTE — LETTER
12/12/2019         RE: Lina Sanford  [address] [facility] [id number]        Dear Colleague,    Thank you for referring your patient, Lina Sanford, to the [facility] [place]. Please see a copy of my visit note below.    ASSESSMENT & PLAN  Lina was seen today for pain.    Diagnoses and all orders for this visit:    Acute pain of left knee  -     PHYSICAL THERAPY REFERRAL (Internal); Future      Patient is a 64 year old female presenting for evaluation of   Chief Complaint   Patient presents with     Left Knee - Pain      # Acute Left Knee Pain: Notable after a twisting injury on 10/25/19 in posterior lateral knee. Pt reporting improving pain and ROM improving about 60-70%.  Pain at posterior lateral region with previous exam showing pos dial test at 30 deg with no ligamentous laxity.  Differential meniscal injury vs posterior lateral corner vs knee sprain vs calf strain.  Pain improved but still persisting.  Given this plan to treat as below and f/u if not improved.  Pt understands and agrees with the plan.    Treatment: activities as tolerated  Physical Therapy PT referred   Injection none  Medications  Limited NSAIDs/Tylenol    Concerning signs/sx that would warrant urgent evaluation were discussed.  All questions were answered, patient understands and agrees with plan.      Return if symptoms worsen or fail to improve.    -----    SUBJECTIVE  Lina Sanford is a/an 64 year old female who is seen as a self referral, AIC for evaluation of left knee pain. The patient is seen with their .    Onset: 10/25/19, 1 day(s) ago. Patient describes injury as running for ball planted on left foot. Pt reported felt a pop all over the knee.  Pt has pain with some radiation to the calf.  No previous injury  Likes to play tennis    Location of Pain: left posteriolateral knee   Rating of Pain at worst: 7/10  Rating of Pain Currently: 0/10  Worsened by: twist, knee extension,  knee flexion   Better with: rest, crutches, Hydrocodone, Ibuprofen   Treatments tried: as above  Associated symptoms: feeling of instability  Orthopedic history: NO  Relevant surgical history: NO    Interim History - October 31, 2019  Since last visit on 10/26/2019 patient has significantly improved pain.  She is no longer using crutches or wearing her knee brace.  She states she has limited ROM with end range flexion and extension.  No interim injury.   Pt can go up stairs, down stairs is painful. Pt overall feels she is about 60-70% improved     Interim History - December 12, 2019  Since last visit on 10/31/2019 patient has waxing and wanning left knee pain.  States that pain is different everyday.  No interim injury.  Pain with stairs, does feel swollen.  Pain with sleeping.  No calf swelling, no HO DVTs, pt went to [place] two weeks after injury.      History reviewed. No pertinent past medical history.  Social History     Socioeconomic History     Marital status:      Spouse name: Not on file     Number of children: Not on file     Years of education: Not on file     Highest education level: Not on file   Occupational History     Not on file   Social Needs     Financial resource strain: Not on file     Food insecurity:     Worry: Not on file     Inability: Not on file     Transportation needs:     Medical: Not on file     Non-medical: Not on file   Tobacco Use     Smoking status: Never Smoker     Smokeless tobacco: Never Used   Substance and Sexual Activity     Alcohol use: Yes     Comment: occasionally approx 2 drinks per week     Drug use: No     Sexual activity: Yes     Partners: Male     Birth control/protection: Surgical     Comment: vasectomy   Lifestyle     Physical activity:     Days per week: Not on file     Minutes per session: Not on file     Stress: Not on file   Relationships     Social connections:     Talks on phone: Not on file     Gets together: Not on file     Attends Mosque service: Not  on file     Active member of club or organization: Not on file     Attends meetings of clubs or organizations: Not on file     Relationship status: Not on file     Intimate partner violence:     Fear of current or ex partner: Not on file     Emotionally abused: Not on file     Physically abused: Not on file     Forced sexual activity: Not on file   Other Topics Concern     Parent/sibling w/ CABG, MI or angioplasty before 65F 55M? No   Social History Narrative     Not on file         Patient's past medical, surgical, social, and family histories were reviewed today and no changes are noted.  Sister had torn ACL    REVIEW OF SYSTEMS:  10 point ROS is negative other than symptoms noted above in HPI, Past Medical History or as stated below  Constitutional: NEGATIVE for fever, chills, change in weight  Skin: NEGATIVE for worrisome rashes, moles or lesions  GI/: NEGATIVE for bowel or bladder changes  Neuro: NEGATIVE for weakness, dizziness or paresthesias    OBJECTIVE:  /65   LMP 06/12/2007    General: healthy, alert and in no distress  HEENT: no scleral icterus or conjunctival erythema  Skin: no suspicious lesions or rash. No jaundice.  CV: no pedal edema  Resp: normal respiratory effort without conversational dyspnea   Psych: normal mood and affect  Gait: normal steady gait with appropriate coordination and balance  Neuro: Normal light sensory exam of lower extremity  MSK:  LEFT KNEE  Inspection:    normal alignment  Palpation:   Very mild TTP about the popliteal region mainly laterally. Remainder of bony and ligamentous landmarks are nontender.    Small effusion is present    Patellofemoral crepitus is Absent  Range of Motion:     00 extension to 1350 flexion mild pain with end flexion  Strength:    Quadriceps 5/5, hamstrings 5-/5, painful, gastrocsoleus 5/5 and tibialis anterior 5/5    Extensor mechanism intact  Special Tests:    Positive: Mild pain with  Kip's     Negative: MCL/valgus stress (0 & 30  deg), LCL/varus stress (0 & 30 deg), Lachman's, anterior drawer, posterior drawer    Independent visualization of the below image:  No results found for this or any previous visit (from the past 24 hour(s)).  KNEE STANDING AP BILATERAL SUNRISE BILATERAL LATERAL LEFT 10/26/2019  9:01 AM      HISTORY: Left knee pain     COMPARISON: None.                                                                    IMPRESSION: Three views of the left knee show no bony or soft tissue  abnormality. Possible minimal joint space effusion. Two views of the  right knee are included and show early medial joint space narrowing  and no other abnormality.    I  ordered, visualized and reviewed these images with the patient  Mild joint space in medial side with small effusion      Myron Rosenthal MD, [facility]      Again, thank you for allowing me to participate in the care of your patient.        Sincerely,        Myron Rosenthal MD

## 2019-12-12 NOTE — PATIENT INSTRUCTIONS
Diagnosis: Left Knee injury  Image Findings: No fracture  Treatment: refer to physical therapy  Medications: Limited tylenol/ibuprofen for pain for 1-2 weeks  Follow-up: 1-2 mon if not improved    It was great seeing you again today!    Myron Rosenthal

## 2019-12-17 ENCOUNTER — THERAPY VISIT (OUTPATIENT)
Dept: PHYSICAL THERAPY | Facility: CLINIC | Age: 64
End: 2019-12-17
Attending: FAMILY MEDICINE
Payer: COMMERCIAL

## 2019-12-17 DIAGNOSIS — M25.562 ACUTE PAIN OF LEFT KNEE: ICD-10-CM

## 2019-12-17 DIAGNOSIS — S89.82XA: ICD-10-CM

## 2019-12-17 PROCEDURE — 97161 PT EVAL LOW COMPLEX 20 MIN: CPT | Mod: GP | Performed by: PHYSICAL THERAPIST

## 2019-12-17 PROCEDURE — 97110 THERAPEUTIC EXERCISES: CPT | Mod: GP | Performed by: PHYSICAL THERAPIST

## 2019-12-17 ASSESSMENT — ACTIVITIES OF DAILY LIVING (ADL)
RAW_SCORE: 47
STAND: ACTIVITY IS NOT DIFFICULT
GIVING WAY, BUCKLING OR SHIFTING OF KNEE: THE SYMPTOM AFFECTS MY ACTIVITY MODERATELY
GO UP STAIRS: ACTIVITY IS MINIMALLY DIFFICULT
PAIN: THE SYMPTOM AFFECTS MY ACTIVITY MODERATELY
SWELLING: THE SYMPTOM AFFECTS MY ACTIVITY SLIGHTLY
SIT WITH YOUR KNEE BENT: ACTIVITY IS MINIMALLY DIFFICULT
SQUAT: ACTIVITY IS SOMEWHAT DIFFICULT
WEAKNESS: THE SYMPTOM AFFECTS MY ACTIVITY SLIGHTLY
KNEEL ON THE FRONT OF YOUR KNEE: ACTIVITY IS MINIMALLY DIFFICULT
KNEE_ACTIVITY_OF_DAILY_LIVING_SUM: 47
AS_A_RESULT_OF_YOUR_KNEE_INJURY,_HOW_WOULD_YOU_RATE_YOUR_CURRENT_LEVEL_OF_DAILY_ACTIVITY?: NEARLY NORMAL
LIMPING: THE SYMPTOM AFFECTS MY ACTIVITY SLIGHTLY
KNEE_ACTIVITY_OF_DAILY_LIVING_SCORE: 67.14
WALK: ACTIVITY IS MINIMALLY DIFFICULT
HOW_WOULD_YOU_RATE_THE_CURRENT_FUNCTION_OF_YOUR_KNEE_DURING_YOUR_USUAL_DAILY_ACTIVITIES_ON_A_SCALE_FROM_0_TO_100_WITH_100_BEING_YOUR_LEVEL_OF_KNEE_FUNCTION_PRIOR_TO_YOUR_INJURY_AND_0_BEING_THE_INABILITY_TO_PERFORM_ANY_OF_YOUR_USUAL_DAILY_ACTIVITIES?: 80
GO DOWN STAIRS: ACTIVITY IS SOMEWHAT DIFFICULT
RISE FROM A CHAIR: ACTIVITY IS MINIMALLY DIFFICULT
STIFFNESS: THE SYMPTOM AFFECTS MY ACTIVITY SLIGHTLY
HOW_WOULD_YOU_RATE_THE_OVERALL_FUNCTION_OF_YOUR_KNEE_DURING_YOUR_USUAL_DAILY_ACTIVITIES?: NEARLY NORMAL

## 2019-12-17 NOTE — PROGRESS NOTES
[facility] Initial Evaluation  Subjective:  The history is provided by the patient.   Type of problem:  Left knee   Condition occurred with:  Running. This is a chronic condition   Problem details: Pt reports onset of L knee pain after hyperextending her knee while playing tennis on 10/25/19. Pain and discomfort has been lingering ever since then. Had x-ray and US imaging done thereafter with no significant findings. Currently notes discomfort around the knee, primiarly behind the knee and some in the anterolateral aspect of knee. Also feels like it is a little swollen still. Pain worse with going up/down stairs (2/10 pain going down), planting and twisting on L foot (playing tennis or doing chores around the house) will cause pain to 3/10, avoids squatting, has been avoiding running and tennis. Pain better with with bracing, rest, ice. Pt is retired..             and reported as 1/10 on pain scale.                                                 Objective:  System                                           Hip Evaluation    Hip Strength:    Flexion:   Left: 4+/5   Pain:  Right: 4+/5   Pain:                    Extension:  Left: 4+/5  Pain:Right: 4+/5    Pain:    Abduction:  Left: 4+/5     Pain:Right: 4+/5    Pain:        Knee Flexion:  Left: 4+/5   +  Pain:Right: 4+/5   Pain:  Knee Extension:  Left: 5-/5   Pain:Right: 5-/5    Pain:                 Knee Evaluation:  ROM:    AROM    Hyperextension:  Left:  5    Right: 5  Extension:  Left: 0    Right:  0  Flexion: Left: 137    Right: 145  PROM        Flexion: Left: 143    Right:  149          Special Tests:   Left knee positive for the following special tests:  Meniscal    Palpation:    Left knee tenderness present at:  Medial Joint Line and Lateral Joint Line        Functional Testing:          Quad:    Single Leg Squat:  Left:       Mild loss of control, femoral IR and excessive anterior knee excursion  Right:       Mild loss of control, excessive  anterior knee excursion and femoral IR  Bilateral Leg Squat:   Excessive anterior knee excursion and femoral IR                  General Evaluation:                      Balance:    Single Leg Stance--Eyes Open:  Left: 0/30 sec    Right: 0/30 sec  Single Leg Stance--Eyes Closed:  Left: 4/30 sec    Right: 3/30 sec                                                   ROS    Assessment/Plan:    Patient is a 64 year old female with left side knee complaints.    Patient has the following significant findings with corresponding treatment plan.                Diagnosis 1:  L knee pain from hyperextension injury  Pain -  hot/cold therapy, US, electric stimulation, mechanical traction, manual therapy, splint/taping/bracing/orthotics, self management, education, directional preference exercise, home program  Decreased ROM/flexibility - manual therapy, therapeutic exercise, therapeutic activity and home program  Decreased joint mobility - manual therapy, therapeutic exercise, therapeutic activity and home program  Decreased strength - therapeutic exercise, therapeutic activities and home program  Impaired gait - gait training and home program  Impaired muscle performance - neuro re-education and home program  Decreased function - therapeutic activities and home program    Therapy Evaluation Codes:   1) History comprised of:   Personal factors that impact the plan of care:      None.    Comorbidity factors that impact the plan of care are:      None.     Medications impacting care: None.  2) Examination of Body Systems comprised of:   Body structures and functions that impact the plan of care:      Knee.   Activity limitations that impact the plan of care are:      Bending, Dressing, Lifting, Running, Sports, Squatting/kneeling, Stairs, Standing and Walking.  3) Clinical presentation characteristics are:   Stable/Uncomplicated.  4) Decision-Making    Low complexity using standardized patient assessment instrument and/or  measureable assessment of functional outcome.  Cumulative Therapy Evaluation is: Low complexity.    Previous and current functional limitations:  (See Goal Flow Sheet for this information)    Short term and Long term goals: (See Goal Flow Sheet for this information)     Communication ability:  Patient appears to be able to clearly communicate and understand verbal and written communication and follow directions correctly.  Treatment Explanation - The following has been discussed with the patient:   RX ordered/plan of care  Anticipated outcomes  Possible risks and side effects  This patient would benefit from PT intervention to resume normal activities.   Rehab potential is good.    Frequency:  1-2 X week, once daily  Duration:  for 6 visits  Discharge Plan:  Achieve all LTG.  Independent in home treatment program.  Reach maximal therapeutic benefit.    Please refer to the daily flowsheet for treatment today, total treatment time and time spent performing 1:1 timed codes.

## 2019-12-20 ENCOUNTER — THERAPY VISIT (OUTPATIENT)
Dept: PHYSICAL THERAPY | Facility: CLINIC | Age: 64
End: 2019-12-20
Payer: COMMERCIAL

## 2019-12-20 DIAGNOSIS — S89.82XA: ICD-10-CM

## 2019-12-20 PROCEDURE — 97110 THERAPEUTIC EXERCISES: CPT | Mod: GP | Performed by: PHYSICAL THERAPIST

## 2019-12-20 PROCEDURE — 97530 THERAPEUTIC ACTIVITIES: CPT | Mod: GP | Performed by: PHYSICAL THERAPIST

## 2019-12-20 PROCEDURE — 97112 NEUROMUSCULAR REEDUCATION: CPT | Mod: GP | Performed by: PHYSICAL THERAPIST

## 2020-01-02 ENCOUNTER — THERAPY VISIT (OUTPATIENT)
Dept: PHYSICAL THERAPY | Facility: CLINIC | Age: 65
End: 2020-01-02
Payer: COMMERCIAL

## 2020-01-02 DIAGNOSIS — S89.82XA: ICD-10-CM

## 2020-01-02 PROCEDURE — 97110 THERAPEUTIC EXERCISES: CPT | Mod: GP | Performed by: PHYSICAL THERAPIST

## 2020-01-02 PROCEDURE — 97112 NEUROMUSCULAR REEDUCATION: CPT | Mod: GP | Performed by: PHYSICAL THERAPIST

## 2020-01-16 ENCOUNTER — THERAPY VISIT (OUTPATIENT)
Dept: PHYSICAL THERAPY | Facility: CLINIC | Age: 65
End: 2020-01-16
Payer: COMMERCIAL

## 2020-01-16 DIAGNOSIS — S89.82XA: ICD-10-CM

## 2020-01-16 PROCEDURE — 97110 THERAPEUTIC EXERCISES: CPT | Mod: GP | Performed by: PHYSICAL THERAPIST

## 2020-01-16 PROCEDURE — 97530 THERAPEUTIC ACTIVITIES: CPT | Mod: GP | Performed by: PHYSICAL THERAPIST

## 2020-02-23 ENCOUNTER — HEALTH MAINTENANCE LETTER (OUTPATIENT)
Age: 65
End: 2020-02-23

## 2020-02-27 ENCOUNTER — HOSPITAL ENCOUNTER (OUTPATIENT)
Dept: MAMMOGRAPHY | Facility: CLINIC | Age: 65
Discharge: HOME | End: 2020-02-27
Attending: FAMILY MEDICINE | Admitting: FAMILY MEDICINE
Payer: COMMERCIAL

## 2020-02-27 DIAGNOSIS — Z12.31 VISIT FOR SCREENING MAMMOGRAM: ICD-10-CM

## 2020-02-27 PROCEDURE — 77067 SCR MAMMO BI INCL CAD: CPT

## 2020-03-10 PROBLEM — S89.82XA HYPEREXTENSION INJURY OF LEFT KNEE: Status: RESOLVED | Noted: 2019-12-17 | Resolved: 2020-03-10

## 2020-03-10 NOTE — PROGRESS NOTES
Discharge Note    Progress reporting period is from initial eval to Jan 16, 2020.     Lina failed to return for next follow up visit and current status is unknown.  Please see information below for last relevant information on current status.  Patient seen for 4 visits.    SUBJECTIVE  Subjective changes noted by patient:  Pt had a flare up about a week and a half ago of knee pain. Tried walking 1 mile, run 1 mile at 6 mph and walking 1 mile following run. Thereafter had a flare up of knee pain and stiffness. Has gotten a little bit better since then but still has some stiffness and pain. Otherwise able to function still without as much pain.   .  Current pain level is 3/10.     Previous pain level was  1/10.   Changes in function:  Yes (See Goal flowsheet attached for changes in current functional level)  Adverse reaction to treatment or activity: None    OBJECTIVE  Changes noted in objective findings: 138* L knee flexion AROM     ASSESSMENT/PLAN  Diagnosis: L knee pain secondary to hyperextension injury   DIAGP:  The encounter diagnosis was Hyperextension injury of left knee.  STG/LTGs have been met or progress has been made towards goals:  Yes, please see goal flowsheet for most current information  Assessment of Progress: current status is unknown.    Last current status: Pt has experienced exacerbation of symptoms   Self Management Plans:  HEP  I have re-evaluated this patient and find that the nature, scope, duration and intensity of the therapy is appropriate for the medical condition of the patient.  Lina continues to require the following intervention to meet STG and LTG's:  HEP.    Recommendations:  Discharge with current home program.  Patient to follow up with MD as needed.    Please refer to the daily flowsheet for treatment today, total treatment time and time spent performing 1:1 timed codes.

## 2020-07-17 ENCOUNTER — TELEPHONE (OUTPATIENT)
Dept: ORTHOPEDICS | Facility: CLINIC | Age: 65
End: 2020-07-17

## 2020-07-17 DIAGNOSIS — M25.562 ACUTE PAIN OF LEFT KNEE: Primary | ICD-10-CM

## 2020-07-17 NOTE — TELEPHONE ENCOUNTER
Called and spoke with patient.      States that she is having persisting stiffness and swelling although better than it was at last OC 12/12/19.    Notes some feeling on minor instability.  When she is stretching she feels a tearing sensation.     She would like to have MRI to evaluate.    Marilyn Zaragoza, ATC

## 2020-07-17 NOTE — TELEPHONE ENCOUNTER
Pt's pain still persisting over the past 7 mon despite rest, HEP and PT.  Will order MRI and have pt f/u in clinic to go over results.  May consider steroid injection at that time depending on results.  Pt contacted about plan.    Myron Rosenthal MD

## 2020-07-21 NOTE — TELEPHONE ENCOUNTER
LVM for patient with MRI scheduling number and stated Dr. Rosenthal wanted to see her back in clinic to review the results.    Marilyn Zaragoza ATC

## 2020-07-22 ENCOUNTER — HOSPITAL ENCOUNTER (OUTPATIENT)
Dept: MRI IMAGING | Facility: CLINIC | Age: 65
Discharge: HOME | End: 2020-07-22
Attending: FAMILY MEDICINE | Admitting: FAMILY MEDICINE
Payer: COMMERCIAL

## 2020-07-22 DIAGNOSIS — M25.562 ACUTE PAIN OF LEFT KNEE: ICD-10-CM

## 2020-07-22 PROCEDURE — 73721 MRI JNT OF LWR EXTRE W/O DYE: CPT | Mod: LT

## 2020-07-30 ENCOUNTER — OFFICE VISIT (OUTPATIENT)
Dept: ORTHOPEDICS | Facility: CLINIC | Age: 65
End: 2020-07-30
Payer: COMMERCIAL

## 2020-07-30 DIAGNOSIS — S83.207A ACUTE MENISCAL TEAR OF LEFT KNEE, INITIAL ENCOUNTER: Primary | ICD-10-CM

## 2020-07-30 DIAGNOSIS — M25.562 ACUTE PAIN OF LEFT KNEE: ICD-10-CM

## 2020-07-30 PROCEDURE — 20611 DRAIN/INJ JOINT/BURSA W/US: CPT | Mod: LT | Performed by: FAMILY MEDICINE

## 2020-07-30 PROCEDURE — 99213 OFFICE O/P EST LOW 20 MIN: CPT | Mod: 25 | Performed by: FAMILY MEDICINE

## 2020-07-30 RX ORDER — ROPIVACAINE HYDROCHLORIDE 5 MG/ML
3 INJECTION, SOLUTION EPIDURAL; INFILTRATION; PERINEURAL
Status: DISCONTINUED | OUTPATIENT
Start: 2020-07-30 | End: 2020-08-12

## 2020-07-30 RX ORDER — BETAMETHASONE SODIUM PHOSPHATE AND BETAMETHASONE ACETATE 3; 3 MG/ML; MG/ML
6 INJECTION, SUSPENSION INTRA-ARTICULAR; INTRALESIONAL; INTRAMUSCULAR; SOFT TISSUE
Status: SHIPPED | OUTPATIENT
Start: 2020-07-30

## 2020-07-30 RX ADMIN — BETAMETHASONE SODIUM PHOSPHATE AND BETAMETHASONE ACETATE 6 MG: 3; 3 INJECTION, SUSPENSION INTRA-ARTICULAR; INTRALESIONAL; INTRAMUSCULAR; SOFT TISSUE at 09:05

## 2020-07-30 RX ADMIN — ROPIVACAINE HYDROCHLORIDE 3 ML: 5 INJECTION, SOLUTION EPIDURAL; INFILTRATION; PERINEURAL at 09:05

## 2020-07-30 NOTE — PROGRESS NOTES
ASSESSMENT & PLAN  Lina was seen today for follow up.    Diagnoses and all orders for this visit:    Acute meniscal tear of left knee, initial encounter  -     Orthopedic & Spine  Referral; Future  -     Large Joint Injection/Arthocentesis: L knee joint    Acute pain of left knee  -     Orthopedic & Spine  Referral; Future  -     Large Joint Injection/Arthocentesis: L knee joint      Patient is a 64 year old female presenting for evaluation of   Chief Complaint   Patient presents with     Left Knee - Follow Up      # Chronic Left Knee Pain: Notable after a twisting injury on 10/25/19 in posterior lateral knee.  Pain still wax/wane not improved with PT.  MRI showing complex lateral meniscal tear.  Pain limiting daily functioning, sleep, recreational activities.  Given this plan to treat as below and f/u if not improved.  Pt understands and agrees with the plan.    Treatment: activities as tolerated  Physical Therapy PT referred   Injection left knee aspiration, steroid injection  Medications  Limited NSAIDs/Tylenol    Concerning signs/sx that would warrant urgent evaluation were discussed.  All questions were answered, patient understands and agrees with plan.      Return if symptoms worsen or fail to improve.    -----    SUBJECTIVE  Lina Sanford is a/an 64 year old female who is seen as a self referral, [facility] for evaluation of left knee pain. The patient is seen with their .    Onset: 10/25/19, 1 day(s) ago. Patient describes injury as running for ball planted on left foot. Pt reported felt a pop all over the knee.  Pt has pain with some radiation to the calf.  No previous injury  Likes to play tennis    Location of Pain: left posteriolateral knee   Rating of Pain at worst: 7/10  Rating of Pain Currently: 0/10  Worsened by: twist, knee extension, knee flexion   Better with: rest, crutches, Hydrocodone, Ibuprofen   Treatments tried: as above  Associated symptoms: feeling of  instability  Orthopedic history: NO  Relevant surgical history: NO    Interim History - October 31, 2019  Since last visit on 10/26/2019 patient has significantly improved pain.  She is no longer using crutches or wearing her knee brace.  She states she has limited ROM with end range flexion and extension.  No interim injury.   Pt can go up stairs, down stairs is painful. Pt overall feels she is about 60-70% improved     Interim History - December 12, 2019  Since last visit on 10/31/2019 patient has waxing and wanning left knee pain.  States that pain is different everyday.  No interim injury.  Pain with stairs, does feel swollen.  Pain with sleeping.  No calf swelling, no HO DVTs, pt went to [place] two weeks after injury.      Interim History - July 30, 2020  Since last visit on 7/17/2020 patient has waxing and wanning stiffness, swelling and instability.  Recent MRI performed 7/22/20. No interim injury.    No past medical history on file.  Social History     Socioeconomic History     Marital status:      Spouse name: Not on file     Number of children: Not on file     Years of education: Not on file     Highest education level: Not on file   Occupational History     Not on file   Social Needs     Financial resource strain: Not on file     Food insecurity:     Worry: Not on file     Inability: Not on file     Transportation needs:     Medical: Not on file     Non-medical: Not on file   Tobacco Use     Smoking status: Never Smoker     Smokeless tobacco: Never Used   Substance and Sexual Activity     Alcohol use: Yes     Comment: occasionally approx 2 drinks per week     Drug use: No     Sexual activity: Yes     Partners: Male     Birth control/protection: Surgical     Comment: vasectomy   Lifestyle     Physical activity:     Days per week: Not on file     Minutes per session: Not on file     Stress: Not on file   Relationships     Social connections:     Talks on phone: Not on file     Gets together: Not on file      Attends Hindu service: Not on file     Active member of club or organization: Not on file     Attends meetings of clubs or organizations: Not on file     Relationship status: Not on file     Intimate partner violence:     Fear of current or ex partner: Not on file     Emotionally abused: Not on file     Physically abused: Not on file     Forced sexual activity: Not on file   Other Topics Concern     Parent/sibling w/ CABG, MI or angioplasty before 65F 55M? No   Social History Narrative     Not on file         Patient's past medical, surgical, social, and family histories were reviewed today and no changes are noted.  Sister had torn ACL    REVIEW OF SYSTEMS:  10 point ROS is negative other than symptoms noted above in HPI, Past Medical History or as stated below  Constitutional: NEGATIVE for fever, chills, change in weight  Skin: NEGATIVE for worrisome rashes, moles or lesions  GI/: NEGATIVE for bowel or bladder changes  Neuro: NEGATIVE for weakness, dizziness or paresthesias    OBJECTIVE:  BP (P) 132/84   Wt (P) 54.7 kg (120 lb 9.6 oz)   LMP 06/12/2007   BMI (P) 20.70 kg/m     General: healthy, alert and in no distress  HEENT: no scleral icterus or conjunctival erythema  Skin: no suspicious lesions or rash. No jaundice.  CV: no pedal edema  Resp: normal respiratory effort without conversational dyspnea   Psych: normal mood and affect  Gait: normal steady gait with appropriate coordination and balance  Neuro: Normal light sensory exam of lower extremity  MSK:  LEFT KNEE  Inspection:    normal alignment  Palpation:   Very mild TTP about the popliteal region mainly laterally. Remainder of bony and ligamentous landmarks are nontender.    Small effusion is present    Patellofemoral crepitus is Absent  Range of Motion:     00 extension to 1350 flexion    Strength:    Quadriceps 5/5, hamstrings 5-/5, painful, gastrocsoleus 5/5 and tibialis anterior 5/5    Extensor mechanism intact  Special Tests:    Positive:  Mild pain with Kip's     Negative: MCL/valgus stress (0 & 30 deg), LCL/varus stress (0 & 30 deg), Lachman's, anterior drawer, posterior drawer    Independent visualization of the below image:  No results found for this or any previous visit (from the past 24 hour(s)).  KNEE STANDING AP BILATERAL SUNRISE BILATERAL LATERAL LEFT 10/26/2019  9:01 AM      HISTORY: Left knee pain     COMPARISON: None.                                                                    IMPRESSION: Three views of the left knee show no bony or soft tissue  abnormality. Possible minimal joint space effusion. Two views of the  right knee are included and show early medial joint space narrowing  and no other abnormality.    MR left knee without contrast 7/23/2020 7:46 AM     Techniques: Multiplanar multisequence imaging of the knee was obtained  without administration of intra-articular or intravenous contrast  using routing protocol.     History: Knee pain, xray internal derangement; ; concern for meniscal  injury, possible posterior lateral corner; Acute pain of left knee     Additional History from EMR: Pain in posterior lateral left knee after  twisting injury on 10/25/2019. Has not improved with physical therapy  or OTC medications.     Comparison: None     Findings:     MENISCI:  Medial meniscus: Horizontal oblique signal within the posterior horn  of the medial meniscus that does not communicate with the articular  surface. Free edge blunting of the medial meniscus     Lateral meniscus: Complex multidirectional tear which appears to  articulate with the femoral and tibial surfaces, foreshortened body of  the lateral meniscus. Meniscal extrusion. Prominent meniscal femoral  ligament.     LIGAMENTS  Cruciate ligaments: Intact.  Medial supporting structures: Intact.  Lateral supporting structures: Intact.     EXTENSOR MECHANISM  Intact.     FLUID  Mild to moderate joint effusion. No substantial Baker's cyst.     OSSEOUS and ARTICULAR  STRUCTURES  Bones: No fracture, contusion, or osseous lesion is seen.     Patellofemoral compartment: Superficial articular cartilage fraying of  the patella and the trochlea..     Medial compartment: No hyaline cartilage disease.     Lateral compartment: Focal areas of high-grade cartilage disease.     ANCILLARY FINDINGS  None.                                                                      Impression:  1. High-grade, complex tearing of the of the anterior horn, body and  posterior horn extending into the posterior root attachment,  associated meniscal extrusion. Prominent meniscal femoral ligament.  Associated mild edema in the posterior tibial plateau. Focal areas of  high-grade cartilage fissuring in the lateral compartment  2. Free edge fraying of the body medial meniscus, mild articular  cartilage cartilage thinning in the medial femorotibial joint  compartment.  3. Mild degenerative changes of the patellofemoral hyaline cartilage.  4. ACL, posterior cruciate ligament, medial and lateral supporting  structures and medial meniscus are intact.   5. Moderate grade joint effusion and a sliver of fluid in the  popliteal cyst.     I have personally reviewed the examination and initial interpretation  and I agree with the findings.     JUTTA ELLERMANN, MD  I  ordered, visualized and reviewed these images with the patient  Mild joint space in medial side with small effusion    Large Joint Injection/Arthocentesis: L knee joint    Date/Time: 7/30/2020 9:05 AM  Performed by: Myron Rosenthal MD  Authorized by: Myron Rosenthal MD     Indications:  Pain  Needle Size:  18 G  Guidance: ultrasound    Approach:  Superolateral  Location:  Knee      Medications:  6 mg betamethasone acet & sod phos 6 (3-3) MG/ML; 3 mL ropivacaine 5 MG/ML  Medications comment:  Actual amount of ropivacaine used 4 mL  Aspirate amount (mL):  5  Aspirate:  Serous and yellow  Outcome:  Tolerated well, no immediate complications  Procedure  discussed: discussed risks, benefits, and alternatives    Consent Given by:  Patient  Timeout: timeout called immediately prior to procedure    Prep: patient was prepped and draped in usual sterile fashion     Patient counseled on risks of infection related to steroids and COVID-19.  Patient reported significant improvement of pain after left knee aspiration and steroid injection.  Aftercare instructions given to patient.  Plan to follow-up as discussed above.     Myron Rosenthal MD [facility]            Myron Rosenthal MD, [facility]

## 2020-07-30 NOTE — PATIENT INSTRUCTIONS
Diagnosis: Left knee meniscal tear  Image Findings: significant lateral knee meniscal tear  Treatment: left knee aspiration, steroid injection, start home exercises in 1-2 weeks when pain improves.  Referral to orthopedic surgery placed if no improvement  Medications: Limited tylenol/ibuprofen for pain for 1-2 weeks  Follow-up: As needed please call [phone] if you have any questions    It was great seeing you again today!    Myron Rosenthal      [facility]    Procedure: left knee aspiration and steroid injection      You may shower, however avoid swimming, tub baths or hot tubs for 24 hours following your procedure    You may have a mild to moderate increase in pain for several days following the injection.    It may take up to 14 days for the steroid medication to start working although you may feel the effect as early as a few days after the procedure.    You may use ice packs for 10-15 minutes, 3 to 4 times a day at the injection site for comfort    You may use anti-inflammatory medications (such as Ibuprofen or Aleve or Advil) or Tylenol for pain control if necessary    If you were fasting, you may resume your normal diet and medications after the procedure    If you have diabetes, check your blood sugar more frequently than usual as your blood sugar may be higher than normal for 10-14 days following a steroid injection. Contact your doctor who manages your diabetes if your blood sugar is higher than usual      If you experience any of the following, [place] [facility] [phone] or [phone]  -Fever over 100 degree F  -Swelling, bleeding, redness, drainage, warmth at the injection site  - New or worsening pain

## 2020-07-30 NOTE — LETTER
7/30/2020         RE: Lina Sanford  [address] [id number]        Dear Colleague,    Thank you for referring your patient, Lina Sanford, to the [facility]. Please see a copy of my visit note below.    ASSESSMENT & PLAN  Lina was seen today for follow up.    Diagnoses and all orders for this visit:    Acute meniscal tear of left knee, initial encounter  -     Orthopedic & Spine  Referral; Future  -     Large Joint Injection/Arthocentesis: L knee joint    Acute pain of left knee  -     Orthopedic & Spine  Referral; Future  -     Large Joint Injection/Arthocentesis: L knee joint      Patient is a 64 year old female presenting for evaluation of   Chief Complaint   Patient presents with     Left Knee - Follow Up      # Chronic Left Knee Pain: Notable after a twisting injury on 10/25/19 in posterior lateral knee.  Pain still wax/wane not improved with PT.  MRI showing complex lateral meniscal tear.  Pain limiting daily functioning, sleep, recreational activities.  Given this plan to treat as below and f/u if not improved.  Pt understands and agrees with the plan.    Treatment: activities as tolerated  Physical Therapy PT referred   Injection left knee aspiration, steroid injection  Medications  Limited NSAIDs/Tylenol    Concerning signs/sx that would warrant urgent evaluation were discussed.  All questions were answered, patient understands and agrees with plan.      Return if symptoms worsen or fail to improve.    -----    SUBJECTIVE  Lina Sanford is a/an 64 year old female who is seen as a self referral, [facility] for evaluation of left knee pain. The patient is seen with their .    Onset: 10/25/19, 1 day(s) ago. Patient describes injury as running for ball planted on left foot. Pt reported felt a pop all over the knee.  Pt has pain with some radiation to the calf.  No previous injury  Likes to play tennis    Location of Pain: left  posteriolateral knee   Rating of Pain at worst: 7/10  Rating of Pain Currently: 0/10  Worsened by: twist, knee extension, knee flexion   Better with: rest, crutches, Hydrocodone, Ibuprofen   Treatments tried: as above  Associated symptoms: feeling of instability  Orthopedic history: NO  Relevant surgical history: NO    Interim History - October 31, 2019  Since last visit on 10/26/2019 patient has significantly improved pain.  She is no longer using crutches or wearing her knee brace.  She states she has limited ROM with end range flexion and extension.  No interim injury.   Pt can go up stairs, down stairs is painful. Pt overall feels she is about 60-70% improved     Interim History - December 12, 2019  Since last visit on 10/31/2019 patient has waxing and wanning left knee pain.  States that pain is different everyday.  No interim injury.  Pain with stairs, does feel swollen.  Pain with sleeping.  No calf swelling, no HO DVTs, pt went to [place] two weeks after injury.      Interim History - July 30, 2020  Since last visit on 7/17/2020 patient has waxing and wanning stiffness, swelling and instability.  Recent MRI performed 7/22/20. No interim injury.    No past medical history on file.  Social History     Socioeconomic History     Marital status:      Spouse name: Not on file     Number of children: Not on file     Years of education: Not on file     Highest education level: Not on file   Occupational History     Not on file   Social Needs     Financial resource strain: Not on file     Food insecurity:     Worry: Not on file     Inability: Not on file     Transportation needs:     Medical: Not on file     Non-medical: Not on file   Tobacco Use     Smoking status: Never Smoker     Smokeless tobacco: Never Used   Substance and Sexual Activity     Alcohol use: Yes     Comment: occasionally approx 2 drinks per week     Drug use: No     Sexual activity: Yes     Partners: Male     Birth control/protection: Surgical      Comment: vasectomy   Lifestyle     Physical activity:     Days per week: Not on file     Minutes per session: Not on file     Stress: Not on file   Relationships     Social connections:     Talks on phone: Not on file     Gets together: Not on file     Attends Scientologist service: Not on file     Active member of club or organization: Not on file     Attends meetings of clubs or organizations: Not on file     Relationship status: Not on file     Intimate partner violence:     Fear of current or ex partner: Not on file     Emotionally abused: Not on file     Physically abused: Not on file     Forced sexual activity: Not on file   Other Topics Concern     Parent/sibling w/ CABG, MI or angioplasty before 65F 55M? No   Social History Narrative     Not on file         Patient's past medical, surgical, social, and family histories were reviewed today and no changes are noted.  Sister had torn ACL    REVIEW OF SYSTEMS:  10 point ROS is negative other than symptoms noted above in HPI, Past Medical History or as stated below  Constitutional: NEGATIVE for fever, chills, change in weight  Skin: NEGATIVE for worrisome rashes, moles or lesions  GI/: NEGATIVE for bowel or bladder changes  Neuro: NEGATIVE for weakness, dizziness or paresthesias    OBJECTIVE:  BP (P) 132/84   Wt (P) 54.7 kg (120 lb 9.6 oz)   LMP 06/12/2007   BMI (P) 20.70 kg/m     General: healthy, alert and in no distress  HEENT: no scleral icterus or conjunctival erythema  Skin: no suspicious lesions or rash. No jaundice.  CV: no pedal edema  Resp: normal respiratory effort without conversational dyspnea   Psych: normal mood and affect  Gait: normal steady gait with appropriate coordination and balance  Neuro: Normal light sensory exam of lower extremity  MSK:  LEFT KNEE  Inspection:    normal alignment  Palpation:   Very mild TTP about the popliteal region mainly laterally. Remainder of bony and ligamentous landmarks are nontender.    Small effusion is  present    Patellofemoral crepitus is Absent  Range of Motion:     00 extension to 1350 flexion    Strength:    Quadriceps 5/5, hamstrings 5-/5, painful, gastrocsoleus 5/5 and tibialis anterior 5/5    Extensor mechanism intact  Special Tests:    Positive: Mild pain with Kip's     Negative: MCL/valgus stress (0 & 30 deg), LCL/varus stress (0 & 30 deg), Lachman's, anterior drawer, posterior drawer    Independent visualization of the below image:  No results found for this or any previous visit (from the past 24 hour(s)).  KNEE STANDING AP BILATERAL SUNRISE BILATERAL LATERAL LEFT 10/26/2019  9:01 AM      HISTORY: Left knee pain     COMPARISON: None.                                                                    IMPRESSION: Three views of the left knee show no bony or soft tissue  abnormality. Possible minimal joint space effusion. Two views of the  right knee are included and show early medial joint space narrowing  and no other abnormality.    MR left knee without contrast 7/23/2020 7:46 AM     Techniques: Multiplanar multisequence imaging of the knee was obtained  without administration of intra-articular or intravenous contrast  using routing protocol.     History: Knee pain, xray internal derangement; ; concern for meniscal  injury, possible posterior lateral corner; Acute pain of left knee     Additional History from EMR: Pain in posterior lateral left knee after  twisting injury on 10/25/2019. Has not improved with physical therapy  or OTC medications.     Comparison: None     Findings:     MENISCI:  Medial meniscus: Horizontal oblique signal within the posterior horn  of the medial meniscus that does not communicate with the articular  surface. Free edge blunting of the medial meniscus     Lateral meniscus: Complex multidirectional tear which appears to  articulate with the femoral and tibial surfaces, foreshortened body of  the lateral meniscus. Meniscal extrusion. Prominent meniscal  femoral  ligament.     LIGAMENTS  Cruciate ligaments: Intact.  Medial supporting structures: Intact.  Lateral supporting structures: Intact.     EXTENSOR MECHANISM  Intact.     FLUID  Mild to moderate joint effusion. No substantial Baker's cyst.     OSSEOUS and ARTICULAR STRUCTURES  Bones: No fracture, contusion, or osseous lesion is seen.     Patellofemoral compartment: Superficial articular cartilage fraying of  the patella and the trochlea..     Medial compartment: No hyaline cartilage disease.     Lateral compartment: Focal areas of high-grade cartilage disease.     ANCILLARY FINDINGS  None.                                                                      Impression:  1. High-grade, complex tearing of the of the anterior horn, body and  posterior horn extending into the posterior root attachment,  associated meniscal extrusion. Prominent meniscal femoral ligament.  Associated mild edema in the posterior tibial plateau. Focal areas of  high-grade cartilage fissuring in the lateral compartment  2. Free edge fraying of the body medial meniscus, mild articular  cartilage cartilage thinning in the medial femorotibial joint  compartment.  3. Mild degenerative changes of the patellofemoral hyaline cartilage.  4. ACL, posterior cruciate ligament, medial and lateral supporting  structures and medial meniscus are intact.   5. Moderate grade joint effusion and a sliver of fluid in the  popliteal cyst.     I have personally reviewed the examination and initial interpretation  and I agree with the findings.     JUTTA ELLERMANN, MD  I  ordered, visualized and reviewed these images with the patient  Mild joint space in medial side with small effusion    Large Joint Injection/Arthocentesis: L knee joint    Date/Time: 7/30/2020 9:05 AM  Performed by: Myron Rosenthal MD  Authorized by: Myron Rosenthal MD     Indications:  Pain  Needle Size:  18 G  Guidance: ultrasound    Approach:  Superolateral  Location:   Knee      Medications:  6 mg betamethasone acet & sod phos 6 (3-3) MG/ML; 3 mL ropivacaine 5 MG/ML  Medications comment:  Actual amount of ropivacaine used 4 mL  Aspirate amount (mL):  5  Aspirate:  Serous and yellow  Outcome:  Tolerated well, no immediate complications  Procedure discussed: discussed risks, benefits, and alternatives    Consent Given by:  Patient  Timeout: timeout called immediately prior to procedure    Prep: patient was prepped and draped in usual sterile fashion     Patient counseled on risks of infection related to steroids and COVID-19.  Patient reported significant improvement of pain after left knee aspiration and steroid injection.  Aftercare instructions given to patient.  Plan to follow-up as discussed above.     Myron Rosenthal MD [facility]            Myron Rosenthal MD, [facility]      Again, thank you for allowing me to participate in the care of your patient.        Sincerely,        Myron Rosenthal MD

## 2020-08-12 ENCOUNTER — ANCILLARY PROCEDURE (OUTPATIENT)
Dept: GENERAL RADIOLOGY | Facility: CLINIC | Age: 65
End: 2020-08-12
Attending: ORTHOPAEDIC SURGERY
Payer: COMMERCIAL

## 2020-08-12 ENCOUNTER — OFFICE VISIT (OUTPATIENT)
Dept: ORTHOPEDICS | Facility: CLINIC | Age: 65
End: 2020-08-12
Attending: FAMILY MEDICINE
Payer: COMMERCIAL

## 2020-08-12 VITALS
HEART RATE: 73 BPM | BODY MASS INDEX: 20.79 KG/M2 | HEIGHT: 64 IN | WEIGHT: 121.8 LBS | DIASTOLIC BLOOD PRESSURE: 74 MMHG | SYSTOLIC BLOOD PRESSURE: 124 MMHG

## 2020-08-12 DIAGNOSIS — S83.279A COMPLEX TEAR OF LATERAL MENISCUS OF KNEE AS CURRENT INJURY, INITIAL ENCOUNTER: ICD-10-CM

## 2020-08-12 DIAGNOSIS — M25.562 CHRONIC PAIN OF LEFT KNEE: Primary | ICD-10-CM

## 2020-08-12 DIAGNOSIS — M25.562 ACUTE PAIN OF LEFT KNEE: ICD-10-CM

## 2020-08-12 DIAGNOSIS — G89.29 CHRONIC PAIN OF LEFT KNEE: Primary | ICD-10-CM

## 2020-08-12 DIAGNOSIS — M17.12 PRIMARY OSTEOARTHRITIS OF LEFT KNEE: ICD-10-CM

## 2020-08-12 DIAGNOSIS — S83.207A ACUTE MENISCAL TEAR OF LEFT KNEE, INITIAL ENCOUNTER: ICD-10-CM

## 2020-08-12 PROCEDURE — 99203 OFFICE O/P NEW LOW 30 MIN: CPT | Performed by: ORTHOPAEDIC SURGERY

## 2020-08-12 PROCEDURE — 73562 X-RAY EXAM OF KNEE 3: CPT | Mod: LT

## 2020-08-12 ASSESSMENT — PAIN SCALES - GENERAL: PAINLEVEL: MILD PAIN (2)

## 2020-08-12 ASSESSMENT — MIFFLIN-ST. JEOR: SCORE: 1092.48

## 2020-08-12 NOTE — PROGRESS NOTES
CHIEF COMPLAINT:   Chief Complaint   Patient presents with     Left Knee - Pain     DOI: 10/25/19. Last injection: 7/30/20. Patient notes she was playing tennis and stepped on her leg wrong while going for a ball. She had instant pain, unable to weightbear. Pain is posterior, superior, and medial. She notes her knee feels     Knee Pain     tight. She has had some PT. Injection helped a lot, thinks maybe removal of the fluid was helpful.    .  Lina Sanford is seen today in the [facility] for evaluation of left knee pain at the request of Dr. Myron Rosenthal MD.        HISTORY OF PRESENT ILLNESS    Lina Sanford is a 64 year old female seen for evaluation of ongoing left knee pain with a known injury.   Pain has been present for 10 months. Onset 10/25/2020 playing tennis, stepped wrong going for a ball, thinks maybe hyperextended. Instant left knee pain, unable to walk for a week, used crutches. Pain was throughout the knee. She has been followed in sports medicine and Physical Therapy. Locates pain in the back, front, inner aspects, moves around, not consistent, varies day to day. Knee feels tight. Had aspiration/injection 7/30/2020 with Dr Rosenthal which has helped with mobility and pain. Was using a hinged knee brace but feels it is limiting, so has stopped.    Has resumed some rec activities like Pickle ball. Has not been able to jog.    Present symptoms: pain diffuse, pain dull/achy , mild pain, mild swelling, no catching/popping, no locking, no giving way.    Pain severity: 2/10  Frequency of symptoms: are constant  Exacerbating Factors: stairs  Relieving Factors: rest, ice, elevation  Night Pain: Yes, sleeps with pillow under the knee  Pain while at rest: No   Numbness or tingling: No   Patient has tried:     NSAIDS: early on, not recently.      Physical Therapy: Yes      Activity modification: Yes      Bracing: Yes      Injections: Yes 7/30/2020     Ice: Yes      Assistive  "device:  Crutches immediately after injury for about a week.      Other PMH:  has no past medical history on file.  Patient Active Problem List   Diagnosis     Postmenopausal bleeding     Rotator cuff impingement syndrome     CARDIOVASCULAR SCREENING; LDL GOAL LESS THAN 160     Advanced directives, counseling/discussion       Surgical Hx:  has a past surgical history that includes surgical history of -  (1975); APPENDECTOMY (1963); and Colonoscopy (1/9/2012).    Medications:   Current Outpatient Medications:      order for DME, Equipment being ordered: small horseshoe hinged knee brace, Disp: , Rfl:     Current Facility-Administered Medications:      betamethasone acet & sod phos (CELESTONE) injection 6 mg, 6 mg, , , Myron Rosenthal MD, 6 mg at 07/30/20 0905     ropivacaine (NAROPIN) injection 3 mL, 3 mL, , , Myron Rosenthal MD, 3 mL at 07/30/20 0905    Allergies:   Allergies   Allergen Reactions     Nkda [No Known Drug Allergies]        Social Hx: retired.   reports that she has never smoked. She has never used smokeless tobacco. She reports current alcohol use. She reports that she does not use drugs.    Family Hx: family history includes C.A.D. in her maternal grandmother, paternal grandfather, paternal grandmother, and sister; Cerebrovascular Disease in her paternal grandfather; Heart Disease in her maternal grandmother; Hypertension in her paternal grandfather; Thyroid Disease in her maternal grandmother.    REVIEW OF SYSTEMS: 10 point ROS neg other than the symptoms noted above in the HPI and PMH. Notables include  CONSTITUTIONAL:NEGATIVE for fever, chills, change in weight  INTEGUMENTARY/SKIN: NEGATIVE for worrisome rashes, moles or lesions  MUSCULOSKELETAL:See HPI above  NEURO: NEGATIVE for weakness, dizziness or paresthesias    PHYSICAL EXAM:  /74   Pulse 73   Ht 1.626 m (5' 4\")   Wt 55.2 kg (121 lb 12.8 oz)   LMP 06/12/2007   BMI 20.91 kg/m     GENERAL APPEARANCE: healthy, alert, no " distress  SKIN: no suspicious lesions or rashes  NEURO: Normal strength and tone, mentation intact and speech normal  PSYCH:  mentation appears normal and affect normal, not anxious  RESPIRATORY: No increased work of breathing.  HANDS: no clubbing, nail pitting  LYMPH: no palpable popliteal lymphadenopathy.    BILATERAL LOWER EXTREMITIES:  Gait: normal  Alignment: neutral  No gross deformities or masses.  No Quad atrophy, strength normal.  Intact sensation deep peroneal nerve, superficial peroneal nerve, med/lat tibial nerve, sural nerve, saphenous nerve  Intact EHL, EDL, TA, FHL, GS, quadriceps hamstrings and hip flexors  Toes warm and well perfused, brisk capillary refill. Palpable 2+ dp pulses.  Bilateral calf soft and nttp or squeeze.  DTRs: achilles 2+, patella 2+.  Edema: none    LEFT KNEE EXAM:    Skin: intact, no ecchymosis or erythema  Squat: 100 %, not limited by pain.   causes anterior/posterior discomfort.  ROM: full extension to 130+ flexion, anterior discomfort with flexion  Tight hamstrings on straight leg raise.  Effusion: small  Tender: lateral joint line minimal, posterior   NTTP medial  joint line, anterior knee  McMurrays: negative    MCL: stable, and non-painful at both 0 and 30 degrees knee flexion  Varus stress: stable, and non-painful at both 0 and 30 degrees knee flexion  Lachmans: neg, firm endpoint  Posterior Drawer stable  Patellofemoral joint:                Apprehension: negative              Crepitations: mild   Grind: positive.    RIGHT KNEE EXAM:    Skin: intact, no ecchymosis or erythema  Squat: 100 %, not limited by pain.     ROM: full extension to 135+ flexion  Tight hamstrings on straight leg raise.  Effusion: none  Tender: NTTP med/lat joint line, anterior or posterior knee  McMurrays: negative    MCL: stable, and non-painful at both 0 and 30 degrees knee flexion  Varus stress: stable, and non-painful at both 0 and 30 degrees knee flexion  Lachmans: neg, firm endpoint  Posterior  "Drawer stable  Patellofemoral joint:                Apprehension: negative              Crepitations: mild   Grind: equivocal.    X-RAY:  3 views left knee from 8/12/2020 were reviewed in clinic today. On my review, no obvious fractures or dislocations. Mild medial and lateral narrowing.    MRI:  MRI left knee from 7/22/2020 was reviewed in clinic today.    1. High-grade, complex tearing of the of the anterior horn, body and  posterior horn extending into the posterior root attachment,  associated meniscal extrusion. Prominent meniscal femoral ligament.  Associated mild edema in the posterior tibial plateau. Focal areas of  high-grade cartilage fissuring in the lateral compartment  2. Free edge fraying of the body medial meniscus, mild articular  cartilage cartilage thinning in the medial femorotibial joint  compartment.  3. Mild degenerative changes of the patellofemoral hyaline cartilage.  4. ACL, posterior cruciate ligament, medial and lateral supporting  structures and medial meniscus are intact.   5. Moderate grade joint effusion and a sliver of fluid in the  popliteal cyst.        ASSESSMENT/PLAN: Lina Sanford is a 64 year old female with chronic left knee pain, primary osteoarthritis, complex lateral meniscus tear.     * reviewed imaging studies with patient, showing arthritic changes, or wearing of the cartilage in the knee. This can be caused by normal \"wear and tear\" over the years or following prior injury to the knee.  * this is mostly lateral and patello-femoral, best seen on MRI, areas likley down to bone lateral compartment.  * lateral meniscus tear likely degenerative in nature.  * not sure how much relief knee arthroscopy with meniscal debridement would help, but possible option if symptoms worsen.    Non-surgical treatment for knee arthritis includes:    * rest, sitting  * Activity modification - avoid impact activities or activities that aggravate symptoms.  * NSAIDS (non-steroidal " "anti-inflammatory medications; e.g. Aleve, advil, motrin, ibuprofen) - regular use for inflammation ( twice daily or three times daily), with food, as long as no contra-indications Please discuss with primary care doctor if needed  * ice, 15-20 minutes at a time several times a day or as needed.  * Strengthening of quadriceps muscles  * Physical Therapy for strengthening, stretching and range of motion exercises of legs  * Tylenol as needed for pain, consider Tylenol arthritis or similar  * Weight loss: Weight loss:  Body mass index is 20.91 kg/m .. weight loss benefits, not only for the current pain symptoms, but also overall health. Recommend a good diet plan that works for the patient, with the assistance of a dietician or primary care doctor as needed. Also, a good, low-impact exercise program for at least 20 minutes per day, 3 times per week, such as exercise bike, elliptical , or pool.  * Exercise: low impact such as stationary bike, elliptical, pool.  * Injections: cortisone versus viscosupplementation (hyaluronic acid, \"rooster comb\", \"gel shots\"); risks and perceived benefits discussed today. Consider repeat in future as needed.  * Bracing: bracing the knee may offer some relief of symptoms when worn and provide some stability.  * over the counter supplements such as glucosamine and chondroitin sulfate may help with joint pain.  * topical ointments may help as well    * return to clinic as needed.          Felipe Valdez M.D., M.S.  Dept. of Orthopaedic Surgery  [facility]      "

## 2020-08-12 NOTE — LETTER
8/12/2020         RE: Lina Sanford  [address] [id number]        Dear Colleague,    Thank you for referring your patient, Lina Sanford, to the [facility] [place]. Please see a copy of my visit note below.    CHIEF COMPLAINT:   Chief Complaint   Patient presents with     Left Knee - Pain     DOI: 10/25/19. Last injection: 7/30/20. Patient notes she was playing tennis and stepped on her leg wrong while going for a ball. She had instant pain, unable to weightbear. Pain is posterior, superior, and medial. She notes her knee feels     Knee Pain     tight. She has had some PT. Injection helped a lot, thinks maybe removal of the fluid was helpful.    .  Lina Sanford is seen today in the [facility] for evaluation of left knee pain at the request of Dr. Myron Rosenthal MD.        HISTORY OF PRESENT ILLNESS    Lina Sanford is a 64 year old female seen for evaluation of ongoing left knee pain with a known injury.   Pain has been present for 10 months. Onset 10/25/2020 playing tennis, stepped wrong going for a ball, thinks maybe hyperextended. Instant left knee pain, unable to walk for a week, used crutches. Pain was throughout the knee. She has been followed in sports medicine and Physical Therapy. Locates pain in the back, front, inner aspects, moves around, not consistent, varies day to day. Knee feels tight. Had aspiration/injection 7/30/2020 with Dr Rosenthal which has helped with mobility and pain. Was using a hinged knee brace but feels it is limiting, so has stopped.    Has resumed some rec activities like Pickle ball. Has not been able to jog.    Present symptoms: pain diffuse, pain dull/achy , mild pain, mild swelling, no catching/popping, no locking, no giving way.    Pain severity: 2/10  Frequency of symptoms: are constant  Exacerbating Factors: stairs  Relieving Factors: rest, ice, elevation  Night Pain: Yes, sleeps with  pillow under the knee  Pain while at rest: No   Numbness or tingling: No   Patient has tried:     NSAIDS: early on, not recently.      Physical Therapy: Yes      Activity modification: Yes      Bracing: Yes      Injections: Yes 7/30/2020     Ice: Yes      Assistive device:  Crutches immediately after injury for about a week.      Other PMH:  has no past medical history on file.  Patient Active Problem List   Diagnosis     Postmenopausal bleeding     Rotator cuff impingement syndrome     CARDIOVASCULAR SCREENING; LDL GOAL LESS THAN 160     Advanced directives, counseling/discussion       Surgical Hx:  has a past surgical history that includes surgical history of -  (1975); APPENDECTOMY (1963); and Colonoscopy (1/9/2012).    Medications:   Current Outpatient Medications:      order for DME, Equipment being ordered: small horseshoe hinged knee brace, Disp: , Rfl:     Current Facility-Administered Medications:      betamethasone acet & sod phos (CELESTONE) injection 6 mg, 6 mg, , , Myron Rosenthal MD, 6 mg at 07/30/20 0905     ropivacaine (NAROPIN) injection 3 mL, 3 mL, , , Myron Rosenthal MD, 3 mL at 07/30/20 0905    Allergies:   Allergies   Allergen Reactions     Nkda [No Known Drug Allergies]        Social Hx: retired.   reports that she has never smoked. She has never used smokeless tobacco. She reports current alcohol use. She reports that she does not use drugs.    Family Hx: family history includes C.A.D. in her maternal grandmother, paternal grandfather, paternal grandmother, and sister; Cerebrovascular Disease in her paternal grandfather; Heart Disease in her maternal grandmother; Hypertension in her paternal grandfather; Thyroid Disease in her maternal grandmother.    REVIEW OF SYSTEMS: 10 point ROS neg other than the symptoms noted above in the HPI and PMH. Notables include  CONSTITUTIONAL:NEGATIVE for fever, chills, change in weight  INTEGUMENTARY/SKIN: NEGATIVE for worrisome rashes, moles or  "lesions  MUSCULOSKELETAL:See HPI above  NEURO: NEGATIVE for weakness, dizziness or paresthesias    PHYSICAL EXAM:  /74   Pulse 73   Ht 1.626 m (5' 4\")   Wt 55.2 kg (121 lb 12.8 oz)   LMP 06/12/2007   BMI 20.91 kg/m     GENERAL APPEARANCE: healthy, alert, no distress  SKIN: no suspicious lesions or rashes  NEURO: Normal strength and tone, mentation intact and speech normal  PSYCH:  mentation appears normal and affect normal, not anxious  RESPIRATORY: No increased work of breathing.  HANDS: no clubbing, nail pitting  LYMPH: no palpable popliteal lymphadenopathy.    BILATERAL LOWER EXTREMITIES:  Gait: normal  Alignment: neutral  No gross deformities or masses.  No Quad atrophy, strength normal.  Intact sensation deep peroneal nerve, superficial peroneal nerve, med/lat tibial nerve, sural nerve, saphenous nerve  Intact EHL, EDL, TA, FHL, GS, quadriceps hamstrings and hip flexors  Toes warm and well perfused, brisk capillary refill. Palpable 2+ dp pulses.  Bilateral calf soft and nttp or squeeze.  DTRs: achilles 2+, patella 2+.  Edema: none    LEFT KNEE EXAM:    Skin: intact, no ecchymosis or erythema  Squat: 100 %, not limited by pain.   causes anterior/posterior discomfort.  ROM: full extension to 130+ flexion, anterior discomfort with flexion  Tight hamstrings on straight leg raise.  Effusion: small  Tender: lateral joint line minimal, posterior   NTTP medial  joint line, anterior knee  McMurrays: negative    MCL: stable, and non-painful at both 0 and 30 degrees knee flexion  Varus stress: stable, and non-painful at both 0 and 30 degrees knee flexion  Lachmans: neg, firm endpoint  Posterior Drawer stable  Patellofemoral joint:                Apprehension: negative              Crepitations: mild   Grind: positive.    RIGHT KNEE EXAM:    Skin: intact, no ecchymosis or erythema  Squat: 100 %, not limited by pain.     ROM: full extension to 135+ flexion  Tight hamstrings on straight leg raise.  Effusion: " "none  Tender: NTTP med/lat joint line, anterior or posterior knee  McMurrays: negative    MCL: stable, and non-painful at both 0 and 30 degrees knee flexion  Varus stress: stable, and non-painful at both 0 and 30 degrees knee flexion  Lachmans: neg, firm endpoint  Posterior Drawer stable  Patellofemoral joint:                Apprehension: negative              Crepitations: mild   Grind: equivocal.    X-RAY:  3 views left knee from 8/12/2020 were reviewed in clinic today. On my review, no obvious fractures or dislocations. Mild medial and lateral narrowing.    MRI:  MRI left knee from 7/22/2020 was reviewed in clinic today.    1. High-grade, complex tearing of the of the anterior horn, body and  posterior horn extending into the posterior root attachment,  associated meniscal extrusion. Prominent meniscal femoral ligament.  Associated mild edema in the posterior tibial plateau. Focal areas of  high-grade cartilage fissuring in the lateral compartment  2. Free edge fraying of the body medial meniscus, mild articular  cartilage cartilage thinning in the medial femorotibial joint  compartment.  3. Mild degenerative changes of the patellofemoral hyaline cartilage.  4. ACL, posterior cruciate ligament, medial and lateral supporting  structures and medial meniscus are intact.   5. Moderate grade joint effusion and a sliver of fluid in the  popliteal cyst.        ASSESSMENT/PLAN: Lina Sanford is a 64 year old female with chronic left knee pain, primary osteoarthritis, complex lateral meniscus tear.     * reviewed imaging studies with patient, showing arthritic changes, or wearing of the cartilage in the knee. This can be caused by normal \"wear and tear\" over the years or following prior injury to the knee.  * this is mostly lateral and patello-femoral, best seen on MRI, areas likley down to bone lateral compartment.  * lateral meniscus tear likely degenerative in nature.  * not sure how much relief knee arthroscopy with " "meniscal debridement would help, but possible option if symptoms worsen.    Non-surgical treatment for knee arthritis includes:    * rest, sitting  * Activity modification - avoid impact activities or activities that aggravate symptoms.  * NSAIDS (non-steroidal anti-inflammatory medications; e.g. Aleve, advil, motrin, ibuprofen) - regular use for inflammation ( twice daily or three times daily), with food, as long as no contra-indications Please discuss with primary care doctor if needed  * ice, 15-20 minutes at a time several times a day or as needed.  * Strengthening of quadriceps muscles  * Physical Therapy for strengthening, stretching and range of motion exercises of legs  * Tylenol as needed for pain, consider Tylenol arthritis or similar  * Weight loss: Weight loss:  Body mass index is 20.91 kg/m .. weight loss benefits, not only for the current pain symptoms, but also overall health. Recommend a good diet plan that works for the patient, with the assistance of a dietician or primary care doctor as needed. Also, a good, low-impact exercise program for at least 20 minutes per day, 3 times per week, such as exercise bike, elliptical , or pool.  * Exercise: low impact such as stationary bike, elliptical, pool.  * Injections: cortisone versus viscosupplementation (hyaluronic acid, \"rooster comb\", \"gel shots\"); risks and perceived benefits discussed today. Consider repeat in future as needed.  * Bracing: bracing the knee may offer some relief of symptoms when worn and provide some stability.  * over the counter supplements such as glucosamine and chondroitin sulfate may help with joint pain.  * topical ointments may help as well    * return to clinic as needed.          Felipe Valdez M.D., M.S.  Dept. of Orthopaedic Surgery  [facility]        Again, thank you for allowing me to participate in the care of your patient.        Sincerely,        Felipe Valdez MD    "

## 2020-11-29 ENCOUNTER — HEALTH MAINTENANCE LETTER (OUTPATIENT)
Age: 65
End: 2020-11-29

## 2021-04-06 ENCOUNTER — IMMUNIZATION (OUTPATIENT)
Dept: PEDIATRICS | Facility: CLINIC | Age: 66
End: 2021-04-06
Payer: COMMERCIAL

## 2021-04-06 PROCEDURE — 91300 PR COVID VAC PFIZER DIL RECON 30 MCG/0.3 ML IM: CPT

## 2021-04-06 PROCEDURE — 0001A PR COVID VAC PFIZER DIL RECON 30 MCG/0.3 ML IM: CPT

## 2021-04-10 ENCOUNTER — HEALTH MAINTENANCE LETTER (OUTPATIENT)
Age: 66
End: 2021-04-10

## 2021-04-27 ENCOUNTER — IMMUNIZATION (OUTPATIENT)
Dept: PEDIATRICS | Facility: CLINIC | Age: 66
End: 2021-04-27
Attending: INTERNAL MEDICINE
Payer: COMMERCIAL

## 2021-04-27 PROCEDURE — 91300 PR COVID VAC PFIZER DIL RECON 30 MCG/0.3 ML IM: CPT

## 2021-04-27 PROCEDURE — 0002A PR COVID VAC PFIZER DIL RECON 30 MCG/0.3 ML IM: CPT

## 2021-05-25 ENCOUNTER — OFFICE VISIT (OUTPATIENT)
Dept: ORTHOPEDICS | Facility: CLINIC | Age: 66
End: 2021-05-25
Payer: COMMERCIAL

## 2021-05-25 ENCOUNTER — ANCILLARY PROCEDURE (OUTPATIENT)
Dept: GENERAL RADIOLOGY | Facility: CLINIC | Age: 66
End: 2021-05-25
Attending: PEDIATRICS
Payer: COMMERCIAL

## 2021-05-25 VITALS
BODY MASS INDEX: 20.49 KG/M2 | WEIGHT: 120 LBS | HEIGHT: 64 IN | SYSTOLIC BLOOD PRESSURE: 106 MMHG | DIASTOLIC BLOOD PRESSURE: 74 MMHG

## 2021-05-25 DIAGNOSIS — S92.345A CLOSED NONDISPLACED FRACTURE OF FOURTH METATARSAL BONE OF LEFT FOOT, INITIAL ENCOUNTER: Primary | ICD-10-CM

## 2021-05-25 DIAGNOSIS — M79.672 LEFT FOOT PAIN: ICD-10-CM

## 2021-05-25 PROCEDURE — 73630 X-RAY EXAM OF FOOT: CPT | Mod: LT | Performed by: RADIOLOGY

## 2021-05-25 PROCEDURE — 99203 OFFICE O/P NEW LOW 30 MIN: CPT | Performed by: PEDIATRICS

## 2021-05-25 ASSESSMENT — MIFFLIN-ST. JEOR: SCORE: 1079.32

## 2021-05-25 NOTE — LETTER
5/25/2021         RE: Lina Sanford  [address] [facility] [address]        Dear Colleague,    Thank you for referring your patient, Lina Sanford, to the [facility] [place]. Please see a copy of my visit note below.    ASSESSMENT & PLAN    Lina was seen today for injury.    Diagnoses and all orders for this visit:    Closed nondisplaced fracture of fourth metatarsal bone of left foot, initial encounter  -     Ankle/Foot Bracing Supplies Order for DME - ONLY FOR DME    Left foot pain  -     XR Foot Left G/E 3 Views; Future  -     Ankle/Foot Bracing Supplies Order for DME - ONLY FOR DME      Subacute fourth metatarsal fracture.  Appears to be faint callus on x-ray.  Alignment acceptable.  Anticipate improvement with time.  Discussed support options, including plate versus fracture shoe versus boot.  Dynaflex plate provided.  Discussed rest from activities for now.  Recheck 2 to 3 weeks, repeat x-ray.  Questions answered. Discussed signs and symptoms that may indicate more serious issues; the patient was instructed to seek appropriate care if noted. Aisha indicates understanding of these issues and agrees with the plan.        See Patient Instructions  Patient Instructions   X-rays show a fracture of the 4th metatarsal. There appears to be some very early healing present.  Icing, elevation, over the counter medication as needed.  Discussed more support for the foot, given the fracture noted. Options include Dynaflex plate, fracture shoe, cam walker.  Recheck 2-3 weeks, with repeat x-ray. This will be with one of my colleagues.    If you have any further questions for your physician or physician s care team you can call [phone] and use option 3 to leave a voice message. Calls received during business hours will be returned same day.    Dr Joe Aragon will be on a planned leave of absence and out of the office June 1 through August 31. His colleagues are  "still providing care at [facility] in [place] in his absence. Dr Anh Bennett, Dr Joe Davis, and Dr Myron Rosenthal will all be seeing patients and managing urgent issues for patients during this time. Feel free to contact the clinic if there are any questions.  Clinic phone: [phone]        Joe Aragon DO  [facility] [place]    -----  Chief Complaint   Patient presents with     Left Foot - Injury       SUBJECTIVE  Lina Sanford is a/an 65 year old female who is seen as a self referral for evaluation of a left foot injury. Standing on a folding chair, chair tipped, she fell; struck foot on metal piece sticking out of water softener/heater. 5/8/21 Pain over the 4th and 5th metatarsals.  She does also have a small abrasion over the plantar aspect of her foot.    Pain has continued with weight bearing on the lateral aspect of her foot.  .     The patient is seen by themselves.      Onset: 17 day(s) ago. Patient describes injury as hit foot on floor   Location of Pain: left foot   Worsened by: full weight bearing   Better with: rest   Treatments tried: no treatment tried to date and ibuprofen  Associated symptoms: swelling    Orthopedic/Surgical history: NO  Social History/Occupation: left foot    No family history pertinent to patient's problem today.     **  No pain at rest. Pain is more with motion, walking.  Notes some swelling.    REVIEW OF SYSTEMS:  Review of Systems   All other systems reviewed and are negative.        OBJECTIVE:  /74   Ht 1.626 m (5' 4\")   Wt 54.4 kg (120 lb)   LMP 06/12/2007   BMI 20.60 kg/m     General: healthy, alert and in no distress  HEENT: no scleral icterus or conjunctival erythema  Skin: no suspicious lesions or rash. No jaundice.  CV: distal perfusion intact   Resp: normal respiratory effort without conversational dyspnea   Psych: normal mood and affect  Gait: Minimal antalgic  Neuro: Normal light sensory " exam of lower extremity     Left Foot exam    ROM:      Full active and passive ROM, ankle dorsiflexion, plantarflexion, inversion, eversion, great toe dorsiflexion, midfoot and subtalar.  Lesser toes mild limitation, with forefoot soreness      Strength: Deferred with injury    Tender: Forefoot laterally    Non-Tender:     remainder of foot and ankle    Inspection:      no deformity noted    Skin:     neg (-) bruising        positive (+) swelling forefoot       well perfused       capillary refill brisk   Healing superficial skin injury plantar forefoot        RADIOLOGY:  I independently ordered, visualized and reviewed these images with the patient  Fourth metatarsal fracture    Recent Results (from the past 24 hour(s))   XR Foot Left G/E 3 Views    Narrative    XR FOOT LEFT G/E 3 VIEWS 5/25/2021 3:16 PM     HISTORY: Left foot pain      Impression    IMPRESSION: Fourth metatarsal neck fracture with mild impaction and  minimal displacement.    AB CAVANAUGH MD             21 minutes spent on the date of the encounter doing chart review, history and exam, documentation and further activities per the note             Again, thank you for allowing me to participate in the care of your patient.        Sincerely,        Joe Aragon DO

## 2021-05-25 NOTE — PATIENT INSTRUCTIONS
X-rays show a fracture of the 4th metatarsal. There appears to be some very early healing present.  Icing, elevation, over the counter medication as needed.  Discussed more support for the foot, given the fracture noted. Options include Dynaflex plate, fracture shoe, cam walker.  Recheck 2-3 weeks, with repeat x-ray. This will be with one of my colleagues.    If you have any further questions for your physician or physician s care team you can call [phone] and use option 3 to leave a voice message. Calls received during business hours will be returned same day.    Dr Joe Aragon will be on a planned leave of absence and out of the office June 1 through August 31. His colleagues are still providing care at [facility] in [place] in his absence. Dr Anh Bennett, Dr Joe Davis, and Dr Myron Rosenthal will all be seeing patients and managing urgent issues for patients during this time. Feel free to contact the clinic if there are any questions.  Clinic phone: [phone]

## 2021-05-25 NOTE — PROGRESS NOTES
ASSESSMENT & PLAN    Lina was seen today for injury.    Diagnoses and all orders for this visit:    Closed nondisplaced fracture of fourth metatarsal bone of left foot, initial encounter  -     Ankle/Foot Bracing Supplies Order for DME - ONLY FOR DME    Left foot pain  -     XR Foot Left G/E 3 Views; Future  -     Ankle/Foot Bracing Supplies Order for DME - ONLY FOR DME      Subacute fourth metatarsal fracture.  Appears to be faint callus on x-ray.  Alignment acceptable.  Anticipate improvement with time.  Discussed support options, including plate versus fracture shoe versus boot.  Dynaflex plate provided.  Discussed rest from activities for now.  Recheck 2 to 3 weeks, repeat x-ray.  Questions answered. Discussed signs and symptoms that may indicate more serious issues; the patient was instructed to seek appropriate care if noted. Aisha indicates understanding of these issues and agrees with the plan.        See Patient Instructions  Patient Instructions   X-rays show a fracture of the 4th metatarsal. There appears to be some very early healing present.  Icing, elevation, over the counter medication as needed.  Discussed more support for the foot, given the fracture noted. Options include Dynaflex plate, fracture shoe, cam walker.  Recheck 2-3 weeks, with repeat x-ray. This will be with one of my colleagues.    If you have any further questions for your physician or physician s care team you can call [phone] and use option 3 to leave a voice message. Calls received during business hours will be returned same day.    Dr Joe Aragon will be on a planned leave of absence and out of the office June 1 through August 31. His colleagues are still providing care at [facility] in [place] in his absence. Dr Anh Bennett, Dr Joe Davis, and Dr Myron Rosenthal will all be seeing patients and managing urgent issues for patients during this time. Feel free to contact the clinic if there are any  "questions.  Clinic phone: [phone]        Joe Aragon DO  [facility] [place]    -----  Chief Complaint   Patient presents with     Left Foot - Injury       SUBJECTIVE  Lina Sanford is a/an 65 year old female who is seen as a self referral for evaluation of a left foot injury. Standing on a folding chair, chair tipped, she fell; struck foot on metal piece sticking out of water softener/heater. 5/8/21 Pain over the 4th and 5th metatarsals.  She does also have a small abrasion over the plantar aspect of her foot.    Pain has continued with weight bearing on the lateral aspect of her foot.  .     The patient is seen by themselves.      Onset: 17 day(s) ago. Patient describes injury as hit foot on floor   Location of Pain: left foot   Worsened by: full weight bearing   Better with: rest   Treatments tried: no treatment tried to date and ibuprofen  Associated symptoms: swelling    Orthopedic/Surgical history: NO  Social History/Occupation: left foot    No family history pertinent to patient's problem today.     **  No pain at rest. Pain is more with motion, walking.  Notes some swelling.    REVIEW OF SYSTEMS:  Review of Systems   All other systems reviewed and are negative.        OBJECTIVE:  /74   Ht 1.626 m (5' 4\")   Wt 54.4 kg (120 lb)   LMP 06/12/2007   BMI 20.60 kg/m     General: healthy, alert and in no distress  HEENT: no scleral icterus or conjunctival erythema  Skin: no suspicious lesions or rash. No jaundice.  CV: distal perfusion intact   Resp: normal respiratory effort without conversational dyspnea   Psych: normal mood and affect  Gait: Minimal antalgic  Neuro: Normal light sensory exam of lower extremity     Left Foot exam    ROM:      Full active and passive ROM, ankle dorsiflexion, plantarflexion, inversion, eversion, great toe dorsiflexion, midfoot and subtalar.  Lesser toes mild limitation, with forefoot soreness      Strength: Deferred with " injury    Tender: Forefoot laterally    Non-Tender:     remainder of foot and ankle    Inspection:      no deformity noted    Skin:     neg (-) bruising        positive (+) swelling forefoot       well perfused       capillary refill brisk   Healing superficial skin injury plantar forefoot        RADIOLOGY:  I independently ordered, visualized and reviewed these images with the patient  Fourth metatarsal fracture    Recent Results (from the past 24 hour(s))   XR Foot Left G/E 3 Views    Narrative    XR FOOT LEFT G/E 3 VIEWS 5/25/2021 3:16 PM     HISTORY: Left foot pain      Impression    IMPRESSION: Fourth metatarsal neck fracture with mild impaction and  minimal displacement.    AB CAVANAUGH MD             21 minutes spent on the date of the encounter doing chart review, history and exam, documentation and further activities per the note

## 2021-06-08 ENCOUNTER — OFFICE VISIT (OUTPATIENT)
Dept: ORTHOPEDICS | Facility: CLINIC | Age: 66
End: 2021-06-08
Payer: COMMERCIAL

## 2021-06-08 ENCOUNTER — ANCILLARY PROCEDURE (OUTPATIENT)
Dept: GENERAL RADIOLOGY | Facility: CLINIC | Age: 66
End: 2021-06-08
Attending: FAMILY MEDICINE
Payer: COMMERCIAL

## 2021-06-08 VITALS
HEIGHT: 64 IN | DIASTOLIC BLOOD PRESSURE: 72 MMHG | SYSTOLIC BLOOD PRESSURE: 128 MMHG | WEIGHT: 120 LBS | BODY MASS INDEX: 20.49 KG/M2

## 2021-06-08 DIAGNOSIS — S92.345D CLOSED NONDISPLACED FRACTURE OF FOURTH METATARSAL BONE OF LEFT FOOT WITH ROUTINE HEALING, SUBSEQUENT ENCOUNTER: Primary | ICD-10-CM

## 2021-06-08 DIAGNOSIS — S92.345A CLOSED NONDISPLACED FRACTURE OF FOURTH METATARSAL BONE OF LEFT FOOT, INITIAL ENCOUNTER: ICD-10-CM

## 2021-06-08 PROCEDURE — 99213 OFFICE O/P EST LOW 20 MIN: CPT | Performed by: FAMILY MEDICINE

## 2021-06-08 PROCEDURE — 73630 X-RAY EXAM OF FOOT: CPT | Mod: LT | Performed by: RADIOLOGY

## 2021-06-08 ASSESSMENT — MIFFLIN-ST. JEOR: SCORE: 1079.32

## 2021-06-08 NOTE — PATIENT INSTRUCTIONS
# Left 4th Metatarsal Head Fracture: Notable her falling off the folding chair on 5/8/2021.  Patient is now 4 weeks out from injury with improved pain but still having some mild tenderness over the fracture site.  Repeat x-ray showing evidence of healing of the fourth metatarsal head fracture.  Given that she has some pain still we will have her monitor symptoms over the next 1 to 2 weeks and then gradually return to sports including golf and tennis.  Image Findings: Evidence of healing of fourth metatarsal head fracture  Treatment: Stiff soled shoe for 1-2 more weeks then gradually return to sports  Medications/Injections: Limited tylenol/ibuprofen for pain for 1-2 weeks  Follow-up: 2 weeks if not improved sooner if worsening    Please call [phone]   Ask for my team if you have any questions or concerns    It was great seeing you again today!    Myron Rosenthal MD, CAM

## 2021-06-08 NOTE — LETTER
6/8/2021         RE: Lina Sanford  [address] [facility] [address]        Dear Colleague,    Thank you for referring your patient, Lina Sanford, to the [facility] [place]. Please see a copy of my visit note below.    ASSESSMENT & PLAN    Lina was seen today for pain.    Diagnoses and all orders for this visit:    Closed nondisplaced fracture of fourth metatarsal bone of left foot with routine healing, subsequent encounter  -     XR Foot LT G/E 3 vw; Future      This issue is acute and Improving.    # Left 4th Metatarsal Head Fracture: Notable her falling off the folding chair on 5/8/2021.  Patient is now 4 weeks out from injury with improved pain but still having some mild tenderness over the fracture site.  Repeat x-ray showing evidence of healing of the fourth metatarsal head fracture.  Given that she has some pain still we will have her monitor symptoms over the next 1 to 2 weeks and then gradually return to sports including golf and tennis.  Image Findings: Evidence of healing of fourth metatarsal head fracture  Treatment: Stiff soled shoe for 1-2 more weeks then gradually return to sports  Medications/Injections: Limited tylenol/ibuprofen for pain for 1-2 weeks  Follow-up: 2 weeks if not improved sooner if worsening         Myron Rosenthal MD  [facility] [place]    -----  Chief Complaint   Patient presents with     Left Foot - Pain       SUBJECTIVE  Lina Sanford is a/an 65 year old female who is seen as a self referral for evaluation of left foot injury.     The patient is seen by themselves.       Onset: 5/8/21, 4.5 week(s) ago. Patient describes injury as Standing on a folding chair, chair tipped, she fell; struck foot on metal piece sticking out of water softener/heater. Evaluated by Dr. Aragon 5/25/21. Noting significant improved improvement of symptoms since last visit 5/25/21  Location of Pain: left lateral  "foot  Worsened by: increased activity, prolonged walking   Better with: rest, activity avoidance  Treatments tried: ibuprofen, Dynaflex plate  Associated symptoms: no distal numbness or tingling; denies swelling or warmth    Orthopedic/Surgical history: NO  Social History/Occupation: Retired IT    No family history pertinent to patient's problem today. In one month if symptoms do not improve, sooner if worsening      REVIEW OF SYSTEMS:  Review of Systems     ROS: 10 point ROS neg other than the symptoms noted above in the HPI.    OBJECTIVE:  /72   Ht 1.626 m (5' 4\")   Wt 54.4 kg (120 lb)   LMP 06/12/2007   BMI 20.60 kg/m     General: healthy, alert and in no distress  HEENT: no scleral icterus or conjunctival erythema  Skin: no suspicious lesions or rash. No jaundice.  CV: distal perfusion intact    Resp: normal respiratory effort without conversational dyspnea   Psych: normal mood and affect  Gait: normal steady gait with appropriate coordination and balance   Neuro: Normal light sensory exam of left lower extremity     LEFT FOOT  Inspection:    no swelling over the dorsal forefoot  Palpation:    Tender about the 4th metatarsal head    No tenderness over the proximal 5th metatarsal or Lisfranc joint  Range of Motion:     Active toe flexion and extension  - full    Active ankle range of motion - full    Passive ankle range of motion - full  Strength:    Intrinsic foot musculature strength - full    Ankle strength - ful  Special Tests:    Positive: None    Negative: calcaneal squeeze and Lisfranc joint tenderness        RADIOLOGY:  I independently ordered, visualized and reviewed these images with the patient  I independently ordered, visualized and reviewed these images with the patient  Evidence of healing 4th metatarsal head fracture    LEFT FOOT THREE OR MORE VIEWS  6/8/2021 11:22 AM      HISTORY:  Closed nondisplaced fracture of fourth metatarsal bone of  left foot, initial encounter.     COMPARISON:  " 5/25/2021                                                                      IMPRESSION: Mild increased callus at the distal fourth metatarsal  fracture without change in position.     MELISSA WEAVER MD            Again, thank you for allowing me to participate in the care of your patient.        Sincerely,        Myron Rosenthal MD

## 2021-06-08 NOTE — PROGRESS NOTES
ASSESSMENT & PLAN    Lina was seen today for pain.    Diagnoses and all orders for this visit:    Closed nondisplaced fracture of fourth metatarsal bone of left foot with routine healing, subsequent encounter  -     XR Foot LT G/E 3 vw; Future      This issue is acute and Improving.    # Left 4th Metatarsal Head Fracture: Notable her falling off the folding chair on 5/8/2021.  Patient is now 4 weeks out from injury with improved pain but still having some mild tenderness over the fracture site.  Repeat x-ray showing evidence of healing of the fourth metatarsal head fracture.  Given that she has some pain still we will have her monitor symptoms over the next 1 to 2 weeks and then gradually return to sports including golf and tennis.  Image Findings: Evidence of healing of fourth metatarsal head fracture  Treatment: Stiff soled shoe for 1-2 more weeks then gradually return to sports  Medications/Injections: Limited tylenol/ibuprofen for pain for 1-2 weeks  Follow-up: 2 weeks if not improved sooner if worsening         Myron Rosenthal MD  [facility] [place]    -----  Chief Complaint   Patient presents with     Left Foot - Pain       SUBJECTIVE  Lina Sanford is a/an 65 year old female who is seen as a self referral for evaluation of left foot injury.     The patient is seen by themselves.       Onset: 5/8/21, 4.5 week(s) ago. Patient describes injury as Standing on a folding chair, chair tipped, she fell; struck foot on metal piece sticking out of water softener/heater. Evaluated by Dr. Aragon 5/25/21. Noting significant improved improvement of symptoms since last visit 5/25/21  Location of Pain: left lateral foot  Worsened by: increased activity, prolonged walking   Better with: rest, activity avoidance  Treatments tried: ibuprofen, Dynaflex plate  Associated symptoms: no distal numbness or tingling; denies swelling or warmth    Orthopedic/Surgical history: NO  Social  "History/Occupation: Retired IT    No family history pertinent to patient's problem today. In one month if symptoms do not improve, sooner if worsening      REVIEW OF SYSTEMS:  Review of Systems     ROS: 10 point ROS neg other than the symptoms noted above in the HPI.    OBJECTIVE:  /72   Ht 1.626 m (5' 4\")   Wt 54.4 kg (120 lb)   LMP 06/12/2007   BMI 20.60 kg/m     General: healthy, alert and in no distress  HEENT: no scleral icterus or conjunctival erythema  Skin: no suspicious lesions or rash. No jaundice.  CV: distal perfusion intact    Resp: normal respiratory effort without conversational dyspnea   Psych: normal mood and affect  Gait: normal steady gait with appropriate coordination and balance   Neuro: Normal light sensory exam of left lower extremity     LEFT FOOT  Inspection:    no swelling over the dorsal forefoot  Palpation:    Tender about the 4th metatarsal head    No tenderness over the proximal 5th metatarsal or Lisfranc joint  Range of Motion:     Active toe flexion and extension  - full    Active ankle range of motion - full    Passive ankle range of motion - full  Strength:    Intrinsic foot musculature strength - full    Ankle strength - ful  Special Tests:    Positive: None    Negative: calcaneal squeeze and Lisfranc joint tenderness        RADIOLOGY:  I independently ordered, visualized and reviewed these images with the patient  I independently ordered, visualized and reviewed these images with the patient  Evidence of healing 4th metatarsal head fracture    LEFT FOOT THREE OR MORE VIEWS  6/8/2021 11:22 AM      HISTORY:  Closed nondisplaced fracture of fourth metatarsal bone of  left foot, initial encounter.     COMPARISON:  5/25/2021                                                                      IMPRESSION: Mild increased callus at the distal fourth metatarsal  fracture without change in position.     MELISSA WEAVER MD        "

## 2021-09-25 ENCOUNTER — HEALTH MAINTENANCE LETTER (OUTPATIENT)
Age: 66
End: 2021-09-25

## 2021-10-22 ENCOUNTER — NURSE TRIAGE (OUTPATIENT)
Dept: NURSING | Facility: CLINIC | Age: 66
End: 2021-10-22

## 2021-10-22 NOTE — TELEPHONE ENCOUNTER
Aisha is trying to schedule her third shot vaccine booster.      We are continuing to schedule all people age 12 and older for COVID-19 vaccines (patients age 12-17 can only use the Pfizer vaccine).     We are offering third doses of the Pfizer and Moderna COVID-19 vaccines to moderately and severely immunocompromised patients. Qualified patients can schedule their third dose vaccine appointment via [employer] or by walking in to one of our retail pharmacies to receive the vaccine - no appointment needed.      Beginning Thursday, Sept. 30, [facility] will begin offering booster doses of the Pf[employer] COVID-19 vaccine to the following:     People 65 years and older.     Residents in long-term care settings.     People ages 50 to 64 with certain underlying medical conditions (refer to CDC: People with Certain Medical Conditions).     People ages 18 to 49 who are at high risk for severe COVID-19 due to certain underlying medical conditions (refer to CDC: People with Certain Medical Conditions).      People ages 18 to 64 who are at increased risk for COVID-19 exposure and transmission because of where they live or work.      To schedule your 1st dose appointment, please log in to [employer] using this link to see when and where we have openings.      To schedule your additional dose appointment for immunocompromised patients or patients that qualify for boosters, please log in to [employer] using this link to see when and where we have openings.     If you have technical difficulty using [employer], call [phone], option 1 for assistance.     More information about vaccine effectiveness at reducing spread of disease, hospitalizations, and death as well as vaccine safety and answers to other questions can be found on our website: [email or web address].

## 2021-11-20 ENCOUNTER — HEALTH MAINTENANCE LETTER (OUTPATIENT)
Age: 66
End: 2021-11-20

## 2022-04-22 ENCOUNTER — IMMUNIZATION (OUTPATIENT)
Dept: FAMILY MEDICINE | Facility: CLINIC | Age: 67
End: 2022-04-22
Payer: COMMERCIAL

## 2022-04-22 DIAGNOSIS — Z23 HIGH PRIORITY FOR 2019-NCOV VACCINE: Primary | ICD-10-CM

## 2022-04-22 PROCEDURE — 91305 COVID-19,PF,PFIZER (12+ YRS): CPT

## 2022-04-22 PROCEDURE — 99207 PR NO CHARGE LOS: CPT

## 2022-04-22 PROCEDURE — 0054A COVID-19,PF,PFIZER (12+ YRS): CPT

## 2022-05-07 ENCOUNTER — HOSPITAL ENCOUNTER (EMERGENCY)
Facility: CLINIC | Age: 67
Discharge: HOME | End: 2022-05-07
Attending: NURSE PRACTITIONER | Admitting: NURSE PRACTITIONER
Payer: COMMERCIAL

## 2022-05-07 ENCOUNTER — NURSE TRIAGE (OUTPATIENT)
Dept: NURSING | Facility: CLINIC | Age: 67
End: 2022-05-07
Payer: COMMERCIAL

## 2022-05-07 ENCOUNTER — HEALTH MAINTENANCE LETTER (OUTPATIENT)
Age: 67
End: 2022-05-07

## 2022-05-07 VITALS
WEIGHT: 120 LBS | HEIGHT: 64 IN | RESPIRATION RATE: 16 BRPM | OXYGEN SATURATION: 97 % | TEMPERATURE: 97.6 F | BODY MASS INDEX: 20.49 KG/M2 | HEART RATE: 81 BPM | DIASTOLIC BLOOD PRESSURE: 89 MMHG | SYSTOLIC BLOOD PRESSURE: 145 MMHG
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
## 2022-05-07 PROCEDURE — G0463 HOSPITAL OUTPT CLINIC VISIT: HCPCS | Performed by: NURSE PRACTITIONER

## 2022-05-07 PROCEDURE — 99203 OFFICE O/P NEW LOW 30 MIN: CPT | Performed by: NURSE PRACTITIONER

## 2022-05-07 RX ORDER — DOXYCYCLINE 100 MG/1
100 CAPSULE ORAL 2 TIMES DAILY
Qty: 10 CAPSULE | Refills: 0 | Status: SHIPPED | OUTPATIENT
Start: 2022-05-07 | End: 2022-05-12

## 2022-05-07 NOTE — TELEPHONE ENCOUNTER
Tick bite embedded in back. Looks sore and infected., and red.  She states her  dug out the embedded legs, and now it is very inflamed.    Patient advised to be seen in  today for evaluation and to R/O cellulitis.  advised patient to be  seen today. [facility] ita advised in [facility], [facility], and [facility] hours were given.    Patient expressed iunderstanding.    Mariya Sheikh RN   [facility] Nurse Advisor      Reason for Disposition    [1] Red or very tender (to touch) area AND [2] started over 24 hours after the bite    Additional Information    Negative: [1] 2 to 14 days following tick bite AND [2] widespread rash or headache AND [3] no fever    Negative: [1] 2 to 14 days following tick bite AND [2] fever AND [3] no rash or headache    Negative: Can't remove tick's head that was broken off in the skin (after trying Care Advice)    Negative: [1] Fever AND [2] red area    Negative: [1] Fever AND [2] area is very tender to touch    Negative: [1] Red streak or red line AND [2] length > 2 inches (5 cm)    Negative: Can't remove live tick (after trying Care Advice)    Negative: [1] 2 to 14 days following tick bite AND [2] severe headache with fever occurs    Negative: [1] 2 to 14 days following tick bite AND [2] widespread rash with fever occurs    Negative: Patient sounds very sick or weak to the triager    Negative: Not a tick bite    Negative: Sounds like a life-threatening emergency to the triager    Protocols used: TICK BITE-A-

## 2022-05-07 NOTE — ED TRIAGE NOTES
Tick bite with redness on back that has been there for at least a day.     Triage Assessment     Row Name 05/07/22 1223       Triage Assessment (Adult)    Airway WDL WDL       Respiratory WDL    Respiratory WDL WDL       Skin Circulation/Temperature WDL    Skin Circulation/Temperature WDL WDL       Cardiac WDL    Cardiac WDL WDL       Peripheral/Neurovascular WDL    Peripheral Neurovascular WDL WDL       Cognitive/Neuro/Behavioral WDL    Cognitive/Neuro/Behavioral WDL WDL

## 2022-05-07 NOTE — DISCHARGE INSTRUCTIONS
I recommend starting the doxycycline today and take 1 capsule by mouth twice daily for 5 days.   Wash the tick bite area with regular soap pat dry, leave open to air.  If you wish to try anything for itching you may try hydrocortisone cream or Benadryl cream.  Otherwise Aquaphor is nice for moisturizing  Follow-up with primary care or return to urgent care if no improvement or concerns of persistent problems or worsening symptoms

## 2022-05-08 NOTE — ED PROVIDER NOTES
History     Chief Complaint   Patient presents with     Tick Bite     HPI  Lina Sanford is a 66 year old female who presents to urgent care with concerns of an infected tick bite.  Patient states tick was on for unknown length of time and  removed it as well as he could.  Patient and  are concerned about possible infection now.  Patient denies any fevers, aches, chills, sweats.  Patient is uncertain but believes it to be a deer tick.    Allergies:  Allergies   Allergen Reactions     Nkda [No Known Drug Allergies]        Problem List:    Patient Active Problem List    Diagnosis Date Noted     Advanced directives, counseling/discussion 07/21/2017     Priority: Medium     Advance Care Planning 7/21/2017: ACP Review of Chart / Resources Provided:  Reviewed chart for advance care plan.  Lina Sanford has been provided information and resources to begin or update their advance care plan.  Added by Yoanna Amaral            CARDIOVASCULAR SCREENING; LDL GOAL LESS THAN 160 10/31/2010     Priority: Medium     Rotator cuff impingement syndrome 05/12/2009     Priority: Medium     Postmenopausal bleeding 05/08/2009     Priority: Medium        Past Medical History:    History reviewed. No pertinent past medical history.    Past Surgical History:    Past Surgical History:   Procedure Laterality Date     COLONOSCOPY  1/9/2012    Procedure:COLONOSCOPY; colonoscopy; Surgeon:ANDRIY PETERSON; Location:[facility]     SURGICAL HISTORY OF -   1975    surgical repair of dislocated R middle finger     ZZC APPENDECTOMY  1963       Family History:    Family History   Problem Relation Age of Onset     Heart Disease Maternal Grandmother         pacemaker     Thyroid Disease Maternal Grandmother         goiter     C.A.D. Maternal Grandmother      C.A.D. Paternal Grandmother      C.A.D. Paternal Grandfather      Hypertension Paternal Grandfather      Cerebrovascular Disease Paternal Grandfather      C.A.D. Sister          "at age 50, no MI but strain     Diabetes No family hx of      Breast Cancer No family hx of      Cancer - colorectal No family hx of        Social History:  Marital Status:   [2]  Social History     Tobacco Use     Smoking status: Never Smoker     Smokeless tobacco: Never Used   Substance Use Topics     Alcohol use: Yes     Comment: occasionally approx 2 drinks per week     Drug use: No        Medications:    doxycycline hyclate (VIBRAMYCIN) 100 MG capsule          Review of Systems  As mentioned above in the history present illness. All other systems were reviewed and are negative.    Physical Exam   BP: (!) 145/89  Pulse: 81  Temp: 97.6  F (36.4  C)  Resp: 16  Height: 162.6 cm (5' 4\")  Weight: 54.4 kg (120 lb)  SpO2: 97 %      Physical Exam  Vitals and nursing note reviewed.   Constitutional:       General: She is not in acute distress.     Appearance: She is well-developed. She is not diaphoretic.   HENT:      Head: Normocephalic and atraumatic.      Right Ear: External ear normal.      Left Ear: External ear normal.   Eyes:      General:         Right eye: No discharge.         Left eye: No discharge.      Conjunctiva/sclera: Conjunctivae normal.   Cardiovascular:      Rate and Rhythm: Regular rhythm.      Heart sounds: Normal heart sounds. No murmur heard.    No friction rub.   Pulmonary:      Effort: Pulmonary effort is normal. No respiratory distress.      Breath sounds: Normal breath sounds. No stridor. No wheezing or rales.   Chest:      Chest wall: No tenderness.   Skin:     General: Skin is warm and dry.      Findings: No erythema (8 mm erythematous lesion with central 4 mm scab without swelling, fluctuant mass--noted on torso back).   Neurological:      Mental Status: She is alert.         ED Course                 Procedures    No results found for this or any previous visit (from the past 24 hour(s)).    Medications - No data to display    Assessments & Plan (with Medical Decision Making)     I " have reviewed the nursing notes.    I have reviewed the findings, diagnosis, plan and need for follow up with the patient.  66-year-old female presenting to urgent care with concerns of infected tick bite.  Tick bite was removed and subsequently there is increasing redness, tenderness in the back without fluctuant masses.  On exam there is faint erythema with a diameter of less than a centimeter with central 4 mm scab without fluctuant mass or concern for erythema migrans.  Discussed prophylactic treatment with tick bites and discussed treatments for cellulitis and discussed treatment for Lyme's disease.  Patient states she would like to be treated for a cellulitis.  Will treat with doxycycline twice daily for 5 days.  No evidence of pustular mass or evidence of abscess that needs incision and drainage and low suspicion for erythema migrans at this point in time.    Discussed with patient worrisome reasons to return.  Patient discharged in stable condition.  Discharge Medication List as of 5/7/2022 12:45 PM      START taking these medications    Details   doxycycline hyclate (VIBRAMYCIN) 100 MG capsule Take 1 capsule (100 mg) by mouth 2 times daily for 5 days, Disp-10 capsule, R-0, E-Prescribe             Final diagnoses:   Tick bite   Bug bite with infection, initial encounter       5/7/2022   [facility] EMERGENCY DEPT     Darian, UVALDO Sullivan CNP  05/08/22 4481

## 2022-11-22 ENCOUNTER — OFFICE VISIT (OUTPATIENT)
Dept: OPTOMETRY | Facility: CLINIC | Age: 67
End: 2022-11-22
Payer: COMMERCIAL

## 2022-11-22 DIAGNOSIS — H52.4 PRESBYOPIA: ICD-10-CM

## 2022-11-22 DIAGNOSIS — H52.221 REGULAR ASTIGMATISM, RIGHT EYE: ICD-10-CM

## 2022-11-22 DIAGNOSIS — H04.123 DRY EYES: Primary | ICD-10-CM

## 2022-11-22 PROCEDURE — 92015 DETERMINE REFRACTIVE STATE: CPT | Performed by: OPTOMETRIST

## 2022-11-22 PROCEDURE — 92004 COMPRE OPH EXAM NEW PT 1/>: CPT | Performed by: OPTOMETRIST

## 2022-11-22 ASSESSMENT — CONF VISUAL FIELD
OD_SUPERIOR_TEMPORAL_RESTRICTION: 0
OD_NORMAL: 1
OD_INFERIOR_TEMPORAL_RESTRICTION: 0
OS_SUPERIOR_NASAL_RESTRICTION: 0
OS_NORMAL: 1
OS_INFERIOR_TEMPORAL_RESTRICTION: 0
OS_INFERIOR_NASAL_RESTRICTION: 0
OD_SUPERIOR_NASAL_RESTRICTION: 0
OS_SUPERIOR_TEMPORAL_RESTRICTION: 0
OD_INFERIOR_NASAL_RESTRICTION: 0

## 2022-11-22 ASSESSMENT — REFRACTION_MANIFEST
OD_AXIS: 160
OS_AXIS: 028
OS_CYLINDER: SPHERE
OD_CYLINDER: +0.50
OD_ADD: +2.50
OD_SPHERE: +0.00
OS_SPHERE: +0.50
OD_SPHERE: PLANO
OD_CYLINDER: +0.50
OS_ADD: +2.50
OS_SPHERE: +0.25
OS_CYLINDER: +0.50
OD_AXIS: 162

## 2022-11-22 ASSESSMENT — REFRACTION_WEARINGRX
OD_SPHERE: +2.00
OS_SPHERE: +2.00
SPECS_TYPE: OTC'S

## 2022-11-22 ASSESSMENT — TONOMETRY
OS_IOP_MMHG: 15
IOP_METHOD: APPLANATION
OD_IOP_MMHG: 15

## 2022-11-22 ASSESSMENT — KERATOMETRY
OD_K1POWER_DIOPTERS: 44.50
OS_K1POWER_DIOPTERS: 45.25
OD_K2POWER_DIOPTERS: 44.25
OS_K2POWER_DIOPTERS: 45.00
OS_AXISANGLE2_DEGREES: 28
OD_AXISANGLE2_DEGREES: 13

## 2022-11-22 ASSESSMENT — VISUAL ACUITY
OD_SC: 20/25
METHOD: SNELLEN - LINEAR
OS_SC+: -1
CORRECTION_TYPE: GLASSES
OD_SC+: -1
OS_SC: 20/50-2
OD_SC: 20/50
OS_SC: 20/20

## 2022-11-22 ASSESSMENT — EXTERNAL EXAM - LEFT EYE: OS_EXAM: NORMAL

## 2022-11-22 ASSESSMENT — SLIT LAMP EXAM - LIDS
COMMENTS: NORMAL
COMMENTS: NORMAL

## 2022-11-22 ASSESSMENT — CUP TO DISC RATIO
OD_RATIO: 0.2
OS_RATIO: 0.2

## 2022-11-22 ASSESSMENT — EXTERNAL EXAM - RIGHT EYE: OD_EXAM: NORMAL

## 2022-11-22 NOTE — LETTER
11/22/2022         RE: Lina Sanford  [address]  [place] [address]        Dear Colleague,    Thank you for referring your patient, Lina Sanford, to the [facility]. Please see a copy of my visit note below.    Chief Complaint   Patient presents with     COMPREHENSIVE EYE EXAM         Last Eye Exam: 5-6 years ago   Dilated Previously: Yes    What are you currently using to see?  Readers, has had a prescription but thinks that the readers are better. Stated she had LOTS of readers and several strengths         Distance Vision Acuity: Satisfied with vision, believes that her distance vision is very clear    Near Vision Acuity: Satisfied with vision while reading and using computer with readers, also uses them to sew and knit     Eye Comfort: good, water in the cold   Do you use eye drops? : No  Occupation or Hobbies: Retired     [employer]           Medical, surgical and family histories reviewed and updated 11/22/2022.       OBJECTIVE: See Ophthalmology exam    ASSESSMENT:    ICD-10-CM    1. Presbyopia  H52.4       2. Regular astigmatism, right eye  H52.221           PLAN:     Patient Instructions   Fill glasses prescription for reading  Use over the counter artificial tears 2 times a day  as needed (Thera Tears , Systane Ultra or Refresh Relieva )   Return in 1 year for eye exam    Krystal Bone, OD  [phone]                      Again, thank you for allowing me to participate in the care of your patient.        Sincerely,        Krystal Bone, OD

## 2022-11-22 NOTE — PATIENT INSTRUCTIONS
Fill glasses prescription for reading  Use over the counter artificial tears 2 times a day  as needed (Thera Tears , Systane Ultra or Refresh Relieva )   Return in 1 year for eye exam    Krystal Bone, OD  [phone]

## 2022-11-22 NOTE — PROGRESS NOTES
Chief Complaint   Patient presents with     COMPREHENSIVE EYE EXAM         Last Eye Exam: 5-6 years ago   Dilated Previously: Yes    What are you currently using to see?  Readers, has had a prescription but thinks that the readers are better. Stated she had LOTS of readers and several strengths         Distance Vision Acuity: Satisfied with vision, believes that her distance vision is very clear    Near Vision Acuity: Satisfied with vision while reading and using computer with readers, also uses them to sew and knit     Eye Comfort: good, water in the cold   Do you use eye drops? : No  Occupation or Hobbies: Retired     [employer] Assistant           Medical, surgical and family histories reviewed and updated 11/22/2022.       OBJECTIVE: See Ophthalmology exam    ASSESSMENT:    ICD-10-CM    1. Presbyopia  H52.4       2. Regular astigmatism, right eye  H52.221           PLAN:     Patient Instructions   Fill glasses prescription for reading  Use over the counter artificial tears 2 times a day  as needed (Thera Tears , Systane Ultra or Refresh Relieva )   Return in 1 year for eye exam    Krystal Bone, OD  [phone]

## 2022-12-06 ENCOUNTER — HOSPITAL ENCOUNTER (OUTPATIENT)
Dept: MAMMOGRAPHY | Facility: CLINIC | Age: 67
Discharge: HOME | End: 2022-12-06
Attending: FAMILY MEDICINE | Admitting: FAMILY MEDICINE
Payer: COMMERCIAL

## 2022-12-06 VITALS — DIASTOLIC BLOOD PRESSURE: 86 MMHG | RESPIRATION RATE: 16 BRPM | HEART RATE: 63 BPM | SYSTOLIC BLOOD PRESSURE: 129 MMHG

## 2022-12-06 DIAGNOSIS — Z12.31 VISIT FOR SCREENING MAMMOGRAM: ICD-10-CM

## 2022-12-06 PROCEDURE — 77067 SCR MAMMO BI INCL CAD: CPT

## 2022-12-06 NOTE — PROGRESS NOTES
Patient here today for mammogram.  Talking with tech about history and having pictures taken, feeling faint while standing for mammo.  Tech had patient sit down before finishing mammogram.  Patient Patient brought to RN prep room.  Patient alert and oriented.  States she maybe was a little dehydrated, did eat a big lunch. Mucous membranes appear slightly dry.  Skin pale pink, warm and dry.  Alert and oriented.  Denies any pain.  BP stable and WNL.  Heart rate regular.  Dr Coleman notified.  Patient feeling ok to go home.  Eating crackers on the way out.

## 2022-12-26 ENCOUNTER — HEALTH MAINTENANCE LETTER (OUTPATIENT)
Age: 67
End: 2022-12-26

## 2023-04-13 ASSESSMENT — ENCOUNTER SYMPTOMS
FEVER: 0
DIARRHEA: 0
DIZZINESS: 0
WEAKNESS: 0
SHORTNESS OF BREATH: 0
DYSURIA: 0
CONSTIPATION: 0
CHILLS: 0
HEMATURIA: 0
HEARTBURN: 0
ARTHRALGIAS: 1
NERVOUS/ANXIOUS: 0
BREAST MASS: 0
SORE THROAT: 0
COUGH: 0
FREQUENCY: 0
HEADACHES: 0
HEMATOCHEZIA: 0
MYALGIAS: 0
EYE PAIN: 0
PALPITATIONS: 0
JOINT SWELLING: 0
ABDOMINAL PAIN: 0
PARESTHESIAS: 0
NAUSEA: 0

## 2023-04-13 ASSESSMENT — ACTIVITIES OF DAILY LIVING (ADL): CURRENT_FUNCTION: NO ASSISTANCE NEEDED

## 2023-04-20 ENCOUNTER — OFFICE VISIT (OUTPATIENT)
Dept: FAMILY MEDICINE | Facility: CLINIC | Age: 68
End: 2023-04-20
Payer: COMMERCIAL

## 2023-04-20 VITALS
TEMPERATURE: 98.4 F | HEIGHT: 64 IN | WEIGHT: 122.6 LBS | RESPIRATION RATE: 15 BRPM | OXYGEN SATURATION: 99 % | SYSTOLIC BLOOD PRESSURE: 151 MMHG | HEART RATE: 68 BPM | DIASTOLIC BLOOD PRESSURE: 78 MMHG | BODY MASS INDEX: 20.93 KG/M2

## 2023-04-20 DIAGNOSIS — Z00.00 ENCOUNTER FOR MEDICARE ANNUAL WELLNESS EXAM: Primary | ICD-10-CM

## 2023-04-20 DIAGNOSIS — Z12.11 SCREEN FOR COLON CANCER: ICD-10-CM

## 2023-04-20 DIAGNOSIS — R20.2 PARESTHESIA: ICD-10-CM

## 2023-04-20 DIAGNOSIS — F43.9 SITUATIONAL STRESS: ICD-10-CM

## 2023-04-20 DIAGNOSIS — M25.552 HIP PAIN, LEFT: ICD-10-CM

## 2023-04-20 DIAGNOSIS — R20.9 ALTERATION IN SENSORY PERCEPTION: ICD-10-CM

## 2023-04-20 DIAGNOSIS — Z13.6 CARDIOVASCULAR SCREENING; LDL GOAL LESS THAN 160: ICD-10-CM

## 2023-04-20 DIAGNOSIS — R06.83 SNORING: ICD-10-CM

## 2023-04-20 PROCEDURE — G0438 PPPS, INITIAL VISIT: HCPCS | Performed by: FAMILY MEDICINE

## 2023-04-20 PROCEDURE — 99204 OFFICE O/P NEW MOD 45 MIN: CPT | Mod: 25 | Performed by: FAMILY MEDICINE

## 2023-04-20 PROCEDURE — 90677 PCV20 VACCINE IM: CPT | Performed by: FAMILY MEDICINE

## 2023-04-20 PROCEDURE — G0009 ADMIN PNEUMOCOCCAL VACCINE: HCPCS | Performed by: FAMILY MEDICINE

## 2023-04-20 ASSESSMENT — ENCOUNTER SYMPTOMS
MYALGIAS: 0
DIARRHEA: 0
CONSTIPATION: 0
ARTHRALGIAS: 1
HEADACHES: 0
FEVER: 0
NAUSEA: 0
JOINT SWELLING: 0
BREAST MASS: 0
DYSURIA: 0
SHORTNESS OF BREATH: 0
DIZZINESS: 0
HEMATURIA: 0
CHILLS: 0
COUGH: 0
EYE PAIN: 0
NERVOUS/ANXIOUS: 0
HEMATOCHEZIA: 0
ABDOMINAL PAIN: 0
HEARTBURN: 0
SORE THROAT: 0
PARESTHESIAS: 0
FREQUENCY: 0
WEAKNESS: 0
PALPITATIONS: 0

## 2023-04-20 ASSESSMENT — ACTIVITIES OF DAILY LIVING (ADL): CURRENT_FUNCTION: NO ASSISTANCE NEEDED

## 2023-04-20 NOTE — PATIENT INSTRUCTIONS
For the snoring:  We discussed a trial of flonase (fluticasone) nasal spray.  I would recommend 2 sprays in each nostril once daily.  Let's give it a good 4 weeks trial.  If symptoms are not improved please let me know so we can take additional steps.     For the feet:  We'll do some labs to look at common underlying causes of neuropathy.  I will let you know results when I see them.  Please let me know if your symptoms are worsening/progressive.    For the hip:  This seems like more of a muscular problem then an arthritis problem.  We'll have you visit with physical therapy for treatment.  Please let me know if symptoms persist after your course of therapy.    I also placed a referral for therapy.  I think it would probably help to speak with someone regarding your recent stressors.  They will give you a call to schedule    You should also get a call to schedule your colonoscopy.

## 2023-04-20 NOTE — PROGRESS NOTES
"SUBJECTIVE:   Aisha is a 67 year old who presents for Preventive Visit.       View : No data to display.              Patient has been advised of split billing requirements and indicates understanding: Yes  Are you in the first 12 months of your Medicare coverage?  No    Healthy Habits:     In general, how would you rate your overall health?  Good    Frequency of exercise:  6-7 days/week    Duration of exercise:  45-60 minutes    Do you usually eat at least 4 servings of fruit and vegetables a day, include whole grains    & fiber and avoid regularly eating high fat or \"junk\" foods?  Yes    Taking medications regularly:  Not Applicable    Medication side effects:  Not applicable    Ability to successfully perform activities of daily living:  No assistance needed    Home Safety:  No safety concerns identified    Hearing Impairment:  No hearing concerns    In the past 6 months, have you been bothered by leaking of urine?  No    In general, how would you rate your overall mental or emotional health?  Good      PHQ-2 Total Score: 0    Additional concerns today:  No      Have you ever done Advance Care Planning? (For example, a Health Directive, POLST, or a discussion with a medical provider or your loved ones about your wishes): No, advance care planning information given to patient to review.  Patient plans to discuss their wishes with loved ones or provider.        Fall risk  Fallen 2 or more times in the past year?: No  Any fall with injury in the past year?: Yes    Cognitive Screening   1) Repeat 3 items (Leader, Season, Table)    2) Clock draw: NORMAL  3) 3 item recall: Recalls 2 objects   Results: NORMAL clock, 1-2 items recalled: COGNITIVE IMPAIRMENT LESS LIKELY    Mini-CogTM Copyright PENNY Arauz. Licensed by the author for use in [facility]; reprinted with permission (makenna@.[facility]). All rights reserved.      Do you have sleep apnea, excessive snoring or daytime drowsiness?: no    Reviewed and updated " as needed this visit by clinical staff   Tobacco  Allergies  Meds   Med Hx  Surg Hx  Fam Hx  Soc Hx        Reviewed and updated as needed this visit by Provider   Tobacco  Allergies  Meds             Social History     Tobacco Use     Smoking status: Never     Smokeless tobacco: Never   Vaping Use     Vaping status: Never Used   Substance Use Topics     Alcohol use: Yes     Comment: occasionally approx 2 drinks per week             4/13/2023    10:39 AM   Alcohol Use   Prescreen: >3 drinks/day or >7 drinks/week? No          View : No data to display.              Do you have a current opioid prescription? No  Do you use any other controlled substances or medications that are not prescribed by a provider? Alcohol          Current providers sharing in care for this patient include:   Patient Care Team:  Amanda Patel DO as PCP - General  Krystal Bone OD as Assigned Surgical Provider    The following health maintenance items are reviewed in Epic and correct as of today:  Health Maintenance   Topic Date Due     ANNUAL REVIEW OF HM ORDERS  Never done     COLORECTAL CANCER SCREENING  01/09/2022     LIPID  08/04/2022     DTAP/TDAP/TD IMMUNIZATION (2 - Td or Tdap) 11/22/2023     MEDICARE ANNUAL WELLNESS VISIT  04/20/2024     FALL RISK ASSESSMENT  04/20/2024     MAMMO SCREENING  12/06/2024     DEXA  12/10/2024     ADVANCE CARE PLANNING  04/20/2028     HEPATITIS C SCREENING  Completed     PHQ-2 (once per calendar year)  Completed     INFLUENZA VACCINE  Completed     Pneumococcal Vaccine: 65+ Years  Completed     ZOSTER IMMUNIZATION  Completed     COVID-19 Vaccine  Completed     IPV IMMUNIZATION  Aged Out     MENINGITIS IMMUNIZATION  Aged Out     PAP  Discontinued             4/19/2023     3:07 PM   Breast CA Risk Assessment (FHS-7)   Do you have a family history of breast, colon, or ovarian cancer? No / Unknown         Pertinent mammograms are reviewed under the imaging tab.    Review of Systems    Constitutional: Negative for chills and fever.   HENT: Negative for congestion, ear pain, hearing loss and sore throat.    Eyes: Negative for pain and visual disturbance.   Respiratory: Negative for cough and shortness of breath.    Cardiovascular: Negative for chest pain, palpitations and peripheral edema.   Gastrointestinal: Negative for abdominal pain, constipation, diarrhea, heartburn, hematochezia and nausea.   Breasts:  Negative for tenderness, breast mass and discharge.   Genitourinary: Negative for dysuria, frequency, genital sores, hematuria, pelvic pain, urgency, vaginal bleeding and vaginal discharge.   Musculoskeletal: Positive for arthralgias. Negative for joint swelling and myalgias.   Skin: Negative for rash.   Neurological: Negative for dizziness, weakness, headaches and paresthesias.   Psychiatric/Behavioral: Negative for mood changes. The patient is not nervous/anxious.      *  L hip pain noted laterally.  Hurts most at night, feels lateral on the hip, not in the groin or buttock.  Achy and does hurt when she lays on it.  bothers a bit when playing tennis, particulalry when lunging for a ball.  Not really painful, mostly achy.  no groin or buttock pain.  Has been bothering for the last 6 months    *  Has some discomfort in the RLQ right at the edge of the pelvic bone.  Gets a sharp fleeting pain in the RLQ that she notices usually with activity.  lasts a second and is gone.  This has been going on for many years.  Happens infrequently, maybe 8 times per year.  No bowel changes or any other concerns.  Has been present and stable intermittently for years.      *  Snores and bothers her .  Has been sleeping on her back more due to hip pain.  Also getting over a cold.  Worse since a mild nose injury about 1 year ago.  No noted apnea, some snort arousals.   Feels like she does not sleep very well since her granddaughter was diagnosed with an illness causing muscle weakness.  Currently under  "intensive medical care.  She has worries about her granddaughter's health as well as her daughter and her family due to the stress of this illness.  Feels like these worries are quite impactful on her sleep and stress/anxiety during the day.  Her granddaughter is doing better but continues to need extensive medical care.        *  Feels like she might have some neuropathy in her feet.  Broke her 4th metatarsal L foot which made things worse.  Has some perception of decreased sensitivity.  Not numb but not quite a sensitive.  Noted from the ball of the foot to the toes.  Maybe occasional tingling.  Feels like her feet are cold often.  Has been for the last few years.  No recent changes, not terribly bothersome but wanted to mention it      OBJECTIVE:   BP (!) 151/78   Pulse 68   Temp 98.4  F (36.9  C) (Tympanic)   Resp 15   Ht 1.623 m (5' 3.9\")   Wt 55.6 kg (122 lb 9.6 oz)   LMP 06/12/2007   SpO2 99%   BMI 21.11 kg/m   Estimated body mass index is 21.11 kg/m  as calculated from the following:    Height as of this encounter: 1.623 m (5' 3.9\").    Weight as of this encounter: 55.6 kg (122 lb 9.6 oz).  Physical Exam  GENERAL APPEARANCE: healthy, alert and no distress  EYES: Eyes grossly normal to inspection, PERRL and conjunctivae and sclerae normal  NECK: no adenopathy, no asymmetry, masses, or scars and thyroid normal to palpation  RESP: lungs clear to auscultation - no rales, rhonchi or wheezes  CV: regular rate and rhythm, normal S1 S2, no S3 or S4, no murmur, click or rub, no peripheral edema and peripheral pulses strong  ABDOMEN: soft, nontender, no hepatosplenomegaly, no masses and bowel sounds normal  MS: no musculoskeletal defects are noted and gait is age appropriate without ataxia.  L hip tender on palpation of the anteriolateral hip, no tenderness on palpation of the greater trochanter.  ROM decreased due to muscular tightness.    NEURO: Normal strength and tone, sensory exam grossly normal, " mentation intact and speech normal  PSYCH: mentation appears normal, tearful when discussing her granddaughter's illness    Diagnostic Test Results:  Labs reviewed in Epic    ASSESSMENT / PLAN:       ICD-10-CM    1. Encounter for Medicare annual wellness exam  Z00.00       2. Alteration in sensory perception  R20.9 TSH with free T4 reflex     Vitamin B12     Comprehensive metabolic panel (BMP + Alb, Alk Phos, ALT, AST, Total. Bili, TP)      3. Situational stress  F43.9 Adult Mental Health  Referral      4. Hip pain, left  M25.552 Physical Therapy Referral      5. Paresthesia  R20.2 Comprehensive metabolic panel (BMP + Alb, Alk Phos, ALT, AST, Total. Bili, TP)      6. Snoring  R06.83       7. CARDIOVASCULAR SCREENING; LDL GOAL LESS THAN 160  Z13.6 Lipid panel reflex to direct LDL Fasting      8. Screen for colon cancer  Z12.11 Colonoscopy Screening  Referral          Patient Instructions   For the snoring:  We discussed a trial of flonase (fluticasone) nasal spray.  I would recommend 2 sprays in each nostril once daily.  Let's give it a good 4 weeks trial.  If symptoms are not improved please let me know so we can take additional steps.     For the feet:  We'll do some labs to look at common underlying causes of neuropathy.  I will let you know results when I see them.  Please let me know if your symptoms are worsening/progressive.    For the hip:  This seems like more of a muscular problem then an arthritis problem.  We'll have you visit with physical therapy for treatment.  Please let me know if symptoms persist after your course of therapy.    I also placed a referral for therapy.  I think it would probably help to speak with someone regarding your recent stressors.  They will give you a call to schedule    You should also get a call to schedule your colonoscopy.                Elevated blood pressure noted, pt quite stressed/anxious about today's visit.  Plan recheck with MA when she returns for  labs.      Patient has been advised of split billing requirements and indicates understanding: Yes      COUNSELING:  Reviewed preventive health counseling, as reflected in patient instructions        She reports that she has never smoked. She has never used smokeless tobacco.      Appropriate preventive services were discussed with this patient, including applicable screening as appropriate for cardiovascular disease, diabetes, osteopenia/osteoporosis, and glaucoma.  As appropriate for age/gender, discussed screening for colorectal cancer, prostate cancer, breast cancer, and cervical cancer. Checklist reviewing preventive services available has been given to the patient.    Reviewed patients plan of care and provided an AVS. The Basic Care Plan (routine screening as documented in Health Maintenance) for Lina meets the Care Plan requirement. This Care Plan has been established and reviewed with the Patient.          Amanda Patel [facility]    Identified Health Risks:    I have reviewed Opioid Use Disorder and Substance Use Disorder risk factors and made any needed referrals.

## 2023-05-04 ENCOUNTER — ALLIED HEALTH/NURSE VISIT (OUTPATIENT)
Dept: FAMILY MEDICINE | Facility: CLINIC | Age: 68
End: 2023-05-04
Payer: COMMERCIAL

## 2023-05-04 ENCOUNTER — LAB (OUTPATIENT)
Dept: LAB | Facility: CLINIC | Age: 68
End: 2023-05-04
Payer: COMMERCIAL

## 2023-05-04 ENCOUNTER — THERAPY VISIT (OUTPATIENT)
Dept: PHYSICAL THERAPY | Facility: CLINIC | Age: 68
End: 2023-05-04
Payer: COMMERCIAL

## 2023-05-04 VITALS — SYSTOLIC BLOOD PRESSURE: 132 MMHG | OXYGEN SATURATION: 98 % | HEART RATE: 67 BPM | DIASTOLIC BLOOD PRESSURE: 80 MMHG

## 2023-05-04 DIAGNOSIS — Z13.6 CARDIOVASCULAR SCREENING; LDL GOAL LESS THAN 160: ICD-10-CM

## 2023-05-04 DIAGNOSIS — Z01.30 BLOOD PRESSURE CHECK: Primary | ICD-10-CM

## 2023-05-04 DIAGNOSIS — R20.9 ALTERATION IN SENSORY PERCEPTION: ICD-10-CM

## 2023-05-04 DIAGNOSIS — M25.552 HIP PAIN, LEFT: ICD-10-CM

## 2023-05-04 DIAGNOSIS — R20.2 PARESTHESIA: ICD-10-CM

## 2023-05-04 LAB
ALBUMIN SERPL BCG-MCNC: 4.4 G/DL (ref 3.5–5.2)
ALP SERPL-CCNC: 113 U/L (ref 35–104)
ALT SERPL W P-5'-P-CCNC: 13 U/L (ref 10–35)
ANION GAP SERPL CALCULATED.3IONS-SCNC: 10 MMOL/L (ref 7–15)
AST SERPL W P-5'-P-CCNC: 21 U/L (ref 10–35)
BILIRUB SERPL-MCNC: 0.8 MG/DL
BUN SERPL-MCNC: 11.5 MG/DL (ref 8–23)
CALCIUM SERPL-MCNC: 9.6 MG/DL (ref 8.8–10.2)
CHLORIDE SERPL-SCNC: 104 MMOL/L (ref 98–107)
CHOLEST SERPL-MCNC: 246 MG/DL
CREAT SERPL-MCNC: 0.75 MG/DL (ref 0.51–0.95)
DEPRECATED HCO3 PLAS-SCNC: 27 MMOL/L (ref 22–29)
GFR SERPL CREATININE-BSD FRML MDRD: 87 ML/MIN/1.73M2
GLUCOSE SERPL-MCNC: 101 MG/DL (ref 70–99)
HDLC SERPL-MCNC: 86 MG/DL
LDLC SERPL CALC-MCNC: 143 MG/DL
NONHDLC SERPL-MCNC: 160 MG/DL
POTASSIUM SERPL-SCNC: 4.6 MMOL/L (ref 3.4–5.3)
PROT SERPL-MCNC: 7.3 G/DL (ref 6.4–8.3)
SODIUM SERPL-SCNC: 141 MMOL/L (ref 136–145)
TRIGL SERPL-MCNC: 87 MG/DL
TSH SERPL DL<=0.005 MIU/L-ACNC: 1.15 UIU/ML (ref 0.3–4.2)
VIT B12 SERPL-MCNC: 203 PG/ML (ref 232–1245)

## 2023-05-04 PROCEDURE — 80061 LIPID PANEL: CPT

## 2023-05-04 PROCEDURE — 82607 VITAMIN B-12: CPT

## 2023-05-04 PROCEDURE — 84443 ASSAY THYROID STIM HORMONE: CPT

## 2023-05-04 PROCEDURE — 97110 THERAPEUTIC EXERCISES: CPT | Mod: GP

## 2023-05-04 PROCEDURE — 80053 COMPREHEN METABOLIC PANEL: CPT

## 2023-05-04 PROCEDURE — 97161 PT EVAL LOW COMPLEX 20 MIN: CPT | Mod: GP

## 2023-05-04 PROCEDURE — 99207 PR NO CHARGE NURSE ONLY: CPT

## 2023-05-04 PROCEDURE — 36415 COLL VENOUS BLD VENIPUNCTURE: CPT

## 2023-05-04 ASSESSMENT — ACTIVITIES OF DAILY LIVING (ADL)
STANDING_FOR_15_MINUTES: NO DIFFICULTY AT ALL
LIGHT_TO_MODERATE_WORK: NO DIFFICULTY AT ALL
GETTING_INTO_AND_OUT_OF_AN_AVERAGE_CAR: NO DIFFICULTY AT ALL
TWISTING/PIVOTING_ON_INVOLVED_LEG: NO DIFFICULTY AT ALL
RECREATIONAL_ACTIVITIES: SLIGHT DIFFICULTY
HEAVY_WORK: NO DIFFICULTY AT ALL
HOS_ADL_COUNT: 17
STEPPING_UP_AND_DOWN_CURBS: NO DIFFICULTY AT ALL
GOING_UP_1_FLIGHT_OF_STAIRS: NO DIFFICULTY AT ALL
WALKING_APPROXIMATELY_10_MINUTES: NO DIFFICULTY AT ALL
WALKING_DOWN_STEEP_HILLS: NO DIFFICULTY AT ALL
WALKING_INITIALLY: NO DIFFICULTY AT ALL
ROLLING_OVER_IN_BED: SLIGHT DIFFICULTY
SITTING_FOR_15_MINUTES: NO DIFFICULTY AT ALL
GOING_DOWN_1_FLIGHT_OF_STAIRS: NO DIFFICULTY AT ALL
HOS_ADL_SCORE(%): 97.06
HOS_ADL_HIGHEST_POTENTIAL_SCORE: 68
WALKING_UP_STEEP_HILLS: NO DIFFICULTY AT ALL
HOW_WOULD_YOU_RATE_YOUR_CURRENT_LEVEL_OF_FUNCTION_DURING_YOUR_USUAL_ACTIVITIES_OF_DAILY_LIVING_FROM_0_TO_100_WITH_100_BEING_YOUR_LEVEL_OF_FUNCTION_PRIOR_TO_YOUR_HIP_PROBLEM_AND_0_BEING_THE_INABILITY_TO_PERFORM_ANY_OF_YOUR_USUAL_DAILY_ACTIVITIES?: 95
GETTING_INTO_AND_OUT_OF_A_BATHTUB: NO DIFFICULTY AT ALL
DEEP_SQUATTING: NO DIFFICULTY AT ALL
WALKING_15_MINUTES_OR_GREATER: NO DIFFICULTY AT ALL
HOS_ADL_ITEM_SCORE_TOTAL: 66
PUTTING_ON_SOCKS_AND_SHOES: NO DIFFICULTY AT ALL

## 2023-05-04 NOTE — PROGRESS NOTES
[facility]    OUTPATIENT Physical Therapy ORTHOPEDIC EVALUATION  PLAN OF TREATMENT FOR OUTPATIENT REHABILITATION  (COMPLETE FOR INITIAL CLAIMS ONLY)  Patient's Last Name, First Name, M.I.  YOB: 1955  JuvenalLnia  VANESSA    Provider s Name:  [facility]   Medical Record No.  [id number]   Start of Care Date:  05/04/23   Onset Date:   11/04/22   Treatment Diagnosis:  Left Hip Pain Medical Diagnosis:  Hip pain, left       Goals:     05/04/23 0500   Body Part   Goals listed below are for Left Hip   Goal #1   Goal #1 self cares/transfers/bed mobility   Previous Functional Level No restrictions   Current Functional Level to change positions in bed   Performance Level 4/10 pain   STG Target Performance Be able to    Performance Level Change positions in bed with no more than 2/10 pain   Rationale for independent living   Due Date 06/01/23   LTG Target Performance Be able to    Performance level change position in bed with no pain   Rationale for independent living   Due Date 06/29/23         Therapy Frequency:  1 x every other week  Predicted Duration of Therapy Intervention:  8 weeks    Jennifer Sanchez, PT                 I CERTIFY THE NEED FOR THESE SERVICES FURNISHED UNDER        THIS PLAN OF TREATMENT AND WHILE UNDER MY CARE     (Physician attestation of this document indicates review and certification of the therapy plan).                     Certification Date From:  05/04/23   Certification Date To:  06/29/23    Referring Provider:  Amanda Patel    Initial Assessment        See Epic Evaluation SOC Date: 05/04/23

## 2023-05-04 NOTE — PROGRESS NOTES
Physical Therapy Initial Evaluation  Subjective:  The history is provided by the patient. No  was used.   Patient Health History  Lina Sanford being seen for Hip pain. Pt notes the majority of her symptoms are when rolling in bed at night while sleeping. She indicates pain at lateral L hip. Pt reports history of left knee and ankle over th elast 3 years what impacted her walking. She plays pickleball, tennis, and walking which are not significantly limited, only noticing the pain with plant and pivot on the left. .     Problem began: 11/4/2022.   Problem occurred: overuse and gradual progression of symptoms.    Pain is reported as 1/10 (3/10 pain with bed mobility. ) on pain scale.  General health as reported by patient is good.  Pertinent medical history includes: pain at night/rest and none.   Red flags:  None as reported by patient.  Medical allergies: none.   Surgeries include:  Orthopedic surgery.    Current medications:  None.    Current occupation is retired. .   Primary job tasks include:  Driving and other.   Other job/home tasks details: Normal household chores. .                                  Objective:    HIP:    PROM:   L  R   Flexion     Extension     Abduction     IR     ER       Strength:   L R   HIP     Flex 4+ 5   Ext     Abd 4 4   KNEE     Flex 5 5   Ext 5 5   IR: painful and 4/5 B.  ER: 4/5 B    Special tests:   L R   Anusha's + -   Shahzad + +   ROBERTO + +   FADIR - -     Palpation: increased tension and tenderness noted at L TFL, piriformis, glute med/min insertion, SIJ and greater trochanter compared to R.     Functional: full depth squat without support with slight R tracking at depths greater than 50%    Gait: mild antalgic gait on L with reduced stance phase and hip extension.       Assessment/Plan:    Patient is a 67 year old female with left side hip complaints.    Patient has the following significant findings with corresponding treatment plan.                 Diagnosis 1:  Left hip pain  Pain -  hot/cold therapy, manual therapy, splint/taping/bracing/orthotics and self management  Decreased ROM/flexibility - manual therapy and therapeutic exercise  Decreased joint mobility - manual therapy and therapeutic exercise  Decreased strength - therapeutic exercise and therapeutic activities    Therapy Evaluation Codes:   1) History comprised of:   Personal factors that impact the plan of care:      None.    Comorbidity factors that impact the plan of care are:      None.     Medications impacting care: None.  2) Examination of Body Systems comprised of:   Body structures and functions that impact the plan of care:      Hip.   Activity limitations that impact the plan of care are:      Lifting, Running, Sitting, Sports, Squatting/kneeling and Walking.  3) Clinical presentation characteristics are:   Stable/Uncomplicated.  4) Decision-Making    Low complexity using standardized patient assessment instrument and/or measureable assessment of functional outcome.  Cumulative Therapy Evaluation is: Low complexity.    Previous and current functional limitations:  (See Goal Flow Sheet for this information)    Short term and Long term goals: (See Goal Flow Sheet for this information)     Communication ability:  Patient appears to be able to clearly communicate and understand verbal and written communication and follow directions correctly.  Treatment Explanation - The following has been discussed with the patient:   RX ordered/plan of care  Anticipated outcomes  Possible risks and side effects  This patient would benefit from PT intervention to resume normal activities.   Rehab potential is good.    Frequency:  1 X every other week, once daily  Duration:  for 8 weeks (4 visits  Discharge Plan:  Achieve all LTG.  Independent in home treatment program.  Reach maximal therapeutic benefit.    Please refer to the daily flowsheet for treatment today, total treatment time and time spent  performing 1:1 timed codes.

## 2023-05-04 NOTE — PROGRESS NOTES
SUBJECTIVE:  Lina Sanford is a 67 year old female who presents for a follow up evaluation of her hypertension.    The reason for the visit is:  an elevated blood pressure was noted elsewhere and they were told to come for a recheck    Patient is not taking medication     Patient is not monitoring Blood Pressure at home.  Average readings if yes are n/a    Current complaints: none      No current outpatient medications on file.     Current Facility-Administered Medications   Medication     betamethasone acet & sod phos (CELESTONE) injection 6 mg       Allergies   Allergen Reactions     Nkda [No Known Drug Allergy]          OBJECTIVE:  Please get a blood pressure AND a pulse.  A height is also needed if has not been done in the past year.    LMP 06/12/2007     Vitals as recorded, a regular cuff was used.    ASSESSMENT:    Is the HYPERTENSION goal on the problem list? No  Patient Active Problem List   Diagnosis     Postmenopausal bleeding     Rotator cuff impingement syndrome     CARDIOVASCULAR SCREENING; LDL GOAL LESS THAN 160     Advanced directives, counseling/discussion       Plan:  The patient's blood pressure is less than documented goal. The patient will be discharged home. CC: this note to the patient's primary provider.

## 2024-04-19 SDOH — HEALTH STABILITY: PHYSICAL HEALTH: ON AVERAGE, HOW MANY MINUTES DO YOU ENGAGE IN EXERCISE AT THIS LEVEL?: 50 MIN

## 2024-04-19 SDOH — HEALTH STABILITY: PHYSICAL HEALTH: ON AVERAGE, HOW MANY DAYS PER WEEK DO YOU ENGAGE IN MODERATE TO STRENUOUS EXERCISE (LIKE A BRISK WALK)?: 6 DAYS

## 2024-04-19 ASSESSMENT — SOCIAL DETERMINANTS OF HEALTH (SDOH): HOW OFTEN DO YOU GET TOGETHER WITH FRIENDS OR RELATIVES?: TWICE A WEEK

## 2024-04-26 ENCOUNTER — OFFICE VISIT (OUTPATIENT)
Dept: FAMILY MEDICINE | Facility: CLINIC | Age: 69
End: 2024-04-26
Attending: FAMILY MEDICINE
Payer: COMMERCIAL

## 2024-04-26 ENCOUNTER — ORDERS ONLY (AUTO-RELEASED) (OUTPATIENT)
Dept: FAMILY MEDICINE | Facility: CLINIC | Age: 69
End: 2024-04-26

## 2024-04-26 VITALS
SYSTOLIC BLOOD PRESSURE: 132 MMHG | DIASTOLIC BLOOD PRESSURE: 88 MMHG | OXYGEN SATURATION: 98 % | TEMPERATURE: 97.4 F | HEART RATE: 66 BPM | HEIGHT: 64 IN | RESPIRATION RATE: 12 BRPM | BODY MASS INDEX: 21.1 KG/M2 | WEIGHT: 123.6 LBS

## 2024-04-26 DIAGNOSIS — Z00.00 ENCOUNTER FOR MEDICARE ANNUAL WELLNESS EXAM: Primary | ICD-10-CM

## 2024-04-26 DIAGNOSIS — Z13.6 CARDIOVASCULAR SCREENING; LDL GOAL LESS THAN 160: ICD-10-CM

## 2024-04-26 DIAGNOSIS — Z78.0 MENOPAUSE: ICD-10-CM

## 2024-04-26 DIAGNOSIS — R73.09 ABNORMAL GLUCOSE: ICD-10-CM

## 2024-04-26 DIAGNOSIS — Z12.11 SCREEN FOR COLON CANCER: ICD-10-CM

## 2024-04-26 DIAGNOSIS — Z12.31 ENCOUNTER FOR SCREENING MAMMOGRAM FOR BREAST CANCER: ICD-10-CM

## 2024-04-26 LAB
ALBUMIN SERPL BCG-MCNC: 4.3 G/DL (ref 3.5–5.2)
ALP SERPL-CCNC: 113 U/L (ref 40–150)
ALT SERPL W P-5'-P-CCNC: 11 U/L (ref 0–50)
ANION GAP SERPL CALCULATED.3IONS-SCNC: 9 MMOL/L (ref 7–15)
AST SERPL W P-5'-P-CCNC: 26 U/L (ref 0–45)
BILIRUB SERPL-MCNC: 0.7 MG/DL
BUN SERPL-MCNC: 8.4 MG/DL (ref 8–23)
CALCIUM SERPL-MCNC: 9.6 MG/DL (ref 8.8–10.2)
CHLORIDE SERPL-SCNC: 103 MMOL/L (ref 98–107)
CHOLEST SERPL-MCNC: 236 MG/DL
CREAT SERPL-MCNC: 0.8 MG/DL (ref 0.51–0.95)
DEPRECATED HCO3 PLAS-SCNC: 29 MMOL/L (ref 22–29)
EGFRCR SERPLBLD CKD-EPI 2021: 80 ML/MIN/1.73M2
FASTING STATUS PATIENT QL REPORTED: YES
GLUCOSE SERPL-MCNC: 98 MG/DL (ref 70–99)
HBA1C MFR BLD: 5.3 % (ref 0–5.6)
HDLC SERPL-MCNC: 89 MG/DL
LDLC SERPL CALC-MCNC: 130 MG/DL
NONHDLC SERPL-MCNC: 147 MG/DL
POTASSIUM SERPL-SCNC: 4.4 MMOL/L (ref 3.4–5.3)
PROT SERPL-MCNC: 7.2 G/DL (ref 6.4–8.3)
SODIUM SERPL-SCNC: 141 MMOL/L (ref 135–145)
TRIGL SERPL-MCNC: 87 MG/DL

## 2024-04-26 PROCEDURE — G0439 PPPS, SUBSEQ VISIT: HCPCS | Performed by: FAMILY MEDICINE

## 2024-04-26 PROCEDURE — 36415 COLL VENOUS BLD VENIPUNCTURE: CPT | Performed by: FAMILY MEDICINE

## 2024-04-26 PROCEDURE — 80061 LIPID PANEL: CPT | Performed by: FAMILY MEDICINE

## 2024-04-26 PROCEDURE — 80053 COMPREHEN METABOLIC PANEL: CPT | Performed by: FAMILY MEDICINE

## 2024-04-26 PROCEDURE — 83036 HEMOGLOBIN GLYCOSYLATED A1C: CPT | Performed by: FAMILY MEDICINE

## 2024-04-26 RX ORDER — CALCIUM/D3/MAG/K2/MIN/HERB 326 333-32 MG
1 TABLET ORAL DAILY
COMMUNITY

## 2024-04-26 RX ORDER — RESPIRATORY SYNCYTIAL VIRUS VACCINE 120MCG/0.5
0.5 KIT INTRAMUSCULAR ONCE
Qty: 1 EACH | Refills: 0 | Status: CANCELLED | OUTPATIENT
Start: 2024-04-26 | End: 2024-04-26

## 2024-04-26 RX ORDER — MAGNESIUM 200 MG
TABLET ORAL DAILY
COMMUNITY

## 2024-04-26 NOTE — PROGRESS NOTES
Preventive Care Visit  [facility] TELMA Patel DO, Family Medicine  Apr 26, 2024      A/P:      ICD-10-CM    1. Encounter for Medicare annual wellness exam  Z00.00       2. Screen for colon cancer  Z12.11 COLOGUARD(EXACT SCIENCES)      3. Menopause  Z78.0 DX Bone Density      4. CARDIOVASCULAR SCREENING; LDL GOAL LESS THAN 160  Z13.6 Lipid panel reflex to direct LDL Fasting     Comprehensive metabolic panel (BMP + Alb, Alk Phos, ALT, AST, Total. Bili, TP)     Lipid panel reflex to direct LDL Fasting     Comprehensive metabolic panel (BMP + Alb, Alk Phos, ALT, AST, Total. Bili, TP)      5. Abnormal glucose  R73.09 Comprehensive metabolic panel (BMP + Alb, Alk Phos, ALT, AST, Total. Bili, TP)     Hemoglobin A1c     Comprehensive metabolic panel (BMP + Alb, Alk Phos, ALT, AST, Total. Bili, TP)     Hemoglobin A1c      6. Encounter for screening mammogram for breast cancer  Z12.31 *MA Screening Digital Bilateral            Subjective   Aisha is a 68 year old, presenting for the following:  Physical        4/26/2024     9:09 AM   Additional Questions   Roomed by Ana COPELAND   Accompanied by Self          Health Care Directive  Patient does not have a Health Care Directive or Living Will: Discussed advance care planning with patient; information given to patient to review.    HPI  No concerns     Tried flonase for snoring and did not make much of a difference.  Not interested in purusing now.     H/o 4th metatasaral fracture.  Still bothers.  Notes it when playing tennis or running, sometimes if walking longer distances.  Feels uncomfortable.  Plans on pursuing different foot ware.  Not interested in additional eval at this time.            4/19/2024   General Health   How would you rate your overall physical health? Good   Feel stress (tense, anxious, or unable to sleep) To some extent   (!) STRESS CONCERN      4/19/2024   Nutrition   Diet: Regular (no restrictions)         4/19/2024   Exercise   Days  per week of moderate/strenous exercise 6 days   Average minutes spent exercising at this level 50 min         4/19/2024   Social Factors   Frequency of gathering with friends or relatives Twice a week   Worry food won't last until get money to buy more No   Food not last or not have enough money for food? No   Do you have housing?  Yes   Are you worried about losing your housing? No   Lack of transportation? No   Unable to get utilities (heat,electricity)? No         4/25/2024   Fall Risk   Fallen 2 or more times in the past year? No   Trouble with walking or balance? No          4/19/2024   Activities of Daily Living- Home Safety   Needs help with the following daily activites None of the above   Safety concerns in the home No grab bars in the bathroom         4/19/2024   Dental   Dentist two times every year? Yes         4/19/2024   Hearing Screening   Hearing concerns? None of the above         4/19/2024   Driving Risk Screening   Patient/family members have concerns about driving No         4/19/2024   General Alertness/Fatigue Screening   Have you been more tired than usual lately? (!) YES         4/19/2024   Urinary Incontinence Screening   Bothered by leaking urine in past 6 months Yes         4/19/2024   TB Screening   Were you born outside of the [place]? No         Today's PHQ-2 Score:       4/25/2024    10:08 AM   PHQ-2 ( 1999 [employer])   Q1: Little interest or pleasure in doing things 0   Q2: Feeling down, depressed or hopeless 0   PHQ-2 Score 0   Q1: Little interest or pleasure in doing things Not at all   Q2: Feeling down, depressed or hopeless Not at all   PHQ-2 Score 0           4/19/2024   Substance Use   Alcohol more than 3/day or more than 7/wk No   Do you have a current opioid prescription? No   How severe/bad is pain from 1 to 10? 1/10   Do you use any other substances recreationally? No     Social History     Tobacco Use    Smoking status: Never    Smokeless tobacco: Never   Vaping Use    Vaping  status: Never Used   Substance Use Topics    Alcohol use: Yes     Comment: occasionally approx 2 drinks per week    Drug use: No          Mammogram Screening - Mammogram every 1-2 years updated in Health Maintenance based on mutual decision making    ASCVD Risk   The 10-year ASCVD risk score (Jake AVENDAÑO, et al., 2019) is: 6.9%    Values used to calculate the score:      Age: 68 years      Sex: Female      Is Non- : No      Diabetic: No      Tobacco smoker: No      Systolic Blood Pressure: 132 mmHg      Is BP treated: No      HDL Cholesterol: 86 mg/dL      Total Cholesterol: 246 mg/dL            Reviewed and updated as needed this visit by Provider   Tobacco  Allergies  Meds                Past Medical History:   Diagnosis Date    Arthritis 2016    Index finger on right hand - also some other fongers    Right-sided Bell's palsy 2018    unable to keep lids closed at night     Past Surgical History:   Procedure Laterality Date    BIOPSY  1978    COLONOSCOPY  01/09/2012    Procedure:COLONOSCOPY; colonoscopy; Surgeon:ANDRIY PETERSON; Location:[facility]    LUMPECTOMY BREAST      SURGICAL HISTORY OF -   01/01/1975    surgical repair of dislocated R middle finger    ZZC APPENDECTOMY  01/01/1963     Current providers sharing in care for this patient include:  Patient Care Team:  Amanda Patel DO as PCP - General  Krystal Bone OD as Assigned Surgical Provider  Amanda Patel DO as Assigned PCP    The following health maintenance items are reviewed in Epic and correct as of today:  Health Maintenance   Topic Date Due    ANNUAL REVIEW OF  ORDERS  Never done    COLORECTAL CANCER SCREENING  01/09/2022    DTAP/TDAP/TD IMMUNIZATION (2 - Td or Tdap) 11/22/2023    COVID-19 Vaccine (7 - 2023-24 season) 03/03/2024    MAMMO SCREENING  12/06/2024    DEXA  12/10/2024    MEDICARE ANNUAL WELLNESS VISIT  04/26/2025    FALL RISK ASSESSMENT  04/26/2025    GLUCOSE  05/04/2026    ADVANCE CARE  "PLANNING  04/21/2028    LIPID  05/04/2028    HEPATITIS C SCREENING  Completed    PHQ-2 (once per calendar year)  Completed    INFLUENZA VACCINE  Completed    Pneumococcal Vaccine: 65+ Years  Completed    ZOSTER IMMUNIZATION  Completed    RSV VACCINE (Pregnancy & 60+)  Completed    IPV IMMUNIZATION  Aged Out    HPV IMMUNIZATION  Aged Out    MENINGITIS IMMUNIZATION  Aged Out    RSV MONOCLONAL ANTIBODY  Aged Out    PAP  Discontinued         Review of Systems  Constitutional, HEENT, cardiovascular, pulmonary, gi and gu systems are negative, except as otherwise noted.     Objective    Exam  /88   Pulse 66   Temp 97.4  F (36.3  C) (Tympanic)   Resp 12   Ht 1.615 m (5' 3.6\")   Wt 56.1 kg (123 lb 9.6 oz)   LMP 06/12/2007   SpO2 98%   BMI 21.48 kg/m     Estimated body mass index is 21.48 kg/m  as calculated from the following:    Height as of this encounter: 1.615 m (5' 3.6\").    Weight as of this encounter: 56.1 kg (123 lb 9.6 oz).    Physical Exam  GENERAL: alert and no distress  EYES: Eyes grossly normal to inspection, PERRL and conjunctivae and sclerae normal  NECK: no adenopathy, no asymmetry, masses, or scars  RESP: lungs clear to auscultation - no rales, rhonchi or wheezes  BREAST: pt declined  CV: regular rate and rhythm, normal S1 S2, no S3 or S4, no murmur, click or rub, no peripheral edema  ABDOMEN: soft, nontender, no hepatosplenomegaly, no masses and bowel sounds normal  MS: no gross musculoskeletal defects noted, no edema  NEURO: Normal strength and tone, mentation intact and speech normal  PSYCH: mentation appears normal, affect normal/bright         4/26/2024   Mini Cog   Clock Draw Score 2 Normal   3 Item Recall 3 objects recalled   Mini Cog Total Score 5              Signed Electronically by: Amanda Patel DO    "

## 2024-04-26 NOTE — PATIENT INSTRUCTIONS
Preventive Care Advice   This is general advice given by our system to help you stay healthy. However, your care team may have specific advice just for you. Please talk to your care team about your preventive care needs.  Nutrition  Eat 5 or more servings of fruits and vegetables each day.  Try wheat bread, brown rice and whole grain pasta (instead of white bread, rice, and pasta).  Get enough calcium and vitamin D. Check the label on foods and aim for 100% of the RDA (recommended daily allowance).  Lifestyle  Exercise at least 150 minutes each week   (30 minutes a day, 5 days a week).  Do muscle strengthening activities 2 days a week. These help control your weight and prevent disease.  No smoking.  Wear sunscreen to prevent skin cancer.  Have a dental exam and cleaning every 6 months.  Yearly exams  See your health care team every year to talk about:  Any changes in your health.  Any medicines your care team has prescribed.  Preventive care, family planning, and ways to prevent chronic diseases.  Shots (vaccines)   HPV shots (up to age 26), if you've never had them before.  Hepatitis B shots (up to age 59), if you've never had them before.  COVID-19 shot: Get this shot when it's due.  Flu shot: Get a flu shot every year.  Tetanus shot: Get a tetanus shot every 10 years.  Pneumococcal, hepatitis A, and RSV shots: Ask your care team if you need these based on your risk.  Shingles shot (for age 50 and up).  General health tests  Diabetes screening:  Starting at age 35, Get screened for diabetes at least every 3 years.  If you are younger than age 35, ask your care team if you should be screened for diabetes.  Cholesterol test: At age 39, start having a cholesterol test every 5 years, or more often if advised.  Bone density scan (DEXA): At age 50, ask your care team if you should have this scan for osteoporosis (brittle bones).  Hepatitis C: Get tested at least once in your life.  STIs (sexually transmitted  infections)  Before age 24: Ask your care team if you should be screened for STIs.  After age 24: Get screened for STIs if you're at risk. You are at risk for STIs (including HIV) if:  You are sexually active with more than one person.  You don't use condoms every time.  You or a partner was diagnosed with a sexually transmitted infection.  If you are at risk for HIV, ask about PrEP medicine to prevent HIV.  Get tested for HIV at least once in your life, whether you are at risk for HIV or not.  Cancer screening tests  Cervical cancer screening: If you have a cervix, begin getting regular cervical cancer screening tests at age 21. Most people who have regular screenings with normal results can stop after age 65. Talk about this with your provider.  Breast cancer scan (mammogram): If you've ever had breasts, begin having regular mammograms starting at age 40. This is a scan to check for breast cancer.  Colon cancer screening: It is important to start screening for colon cancer at age 45.  Have a colonoscopy test every 10 years (or more often if you're at risk) Or, ask your provider about stool tests like a FIT test every year or Cologuard test every 3 years.  To learn more about your testing options, visit: https://www.Bee[employer]ite/837198.pdf.  For help making a decision, visit: https://bit.ly/qz09528.  Prostate cancer screening test: If you have a prostate and are age 55 to 69, ask your provider if you would benefit from a yearly prostate cancer screening test.  Lung cancer screening: If you are a current or former smoker age 50 to 80, ask your care team if ongoing lung cancer screenings are right for you.  For informational purposes only. Not to replace the advice of your health care provider. Copyright   2023 [employer]. All rights reserved. Clinically reviewed by the [facility]. [employer] 463306 - REV 01/24.    Learning About Activities of Daily Living  What are activities  of daily living?     Activities of daily living (ADLs) are the basic self-care tasks you do every day. These include eating, bathing, dressing, and moving around.  As you age, and if you have health problems, you may find that it's harder to do some of these tasks. If so, your doctor can suggest ideas that may help.  To measure what kind of help you may need, your doctor will ask how well you are able to do ADLs. Let your doctor know if there are any tasks that you are having trouble doing. This is an important first step to getting help. And when you have the help you need, you can stay as independent as possible.  How will a doctor assess your ADLs?  Asking about ADLs is part of a routine health checkup your doctor will likely do as you age. Your health check might be done in a doctor's office, in your home, or at a hospital. The goal is to find out if you are having any problems that could make it hard to care for yourself or that make it unsafe for you to be on your own.  To measure your ADLs, your doctor will ask how hard it is for you to do routine tasks. Your doctor may also want to know if you have changed the way you do a task because of a health problem. Your doctor may watch how you:  Walk back and forth.  Keep your balance while you stand or walk.  Move from sitting to standing or from a bed to a chair.  Button or unbutton a shirt or sweater.  Remove and put on your shoes.  It's common to feel a little worried or anxious if you find you can't do all the things you used to be able to do. Talking with your doctor about ADLs is a way to make sure you're as safe as possible and able to care for yourself as well as you can. You may want to bring a caregiver, friend, or family member to your checkup. They can help you talk to your doctor.  Follow-up care is a key part of your treatment and safety. Be sure to make and go to all appointments, and call your doctor if you are having problems. It's also a good idea  to know your test results and keep a list of the medicines you take.  Current as of: October 24, 2023               Content Version: 14.0    0187-1012 [employer].   Care instructions adapted under license by your healthcare professional. If you have questions about a medical condition or this instruction, always ask your healthcare professional. [employer] disclaims any warranty or liability for your use of this information.      Learning About Stress  What is stress?     Stress is your body's response to a hard situation. Your body can have a physical, emotional, or mental response. Stress is a fact of life for most people, and it affects everyone differently. What causes stress for you may not be stressful for someone else.  A lot of things can cause stress. You may feel stress when you go on a job interview, take a test, or run a race. This kind of short-term stress is normal and even useful. It can help you if you need to work hard or react quickly. For example, stress can help you finish an important job on time.  Long-term stress is caused by ongoing stressful situations or events. Examples of long-term stress include long-term health problems, ongoing problems at work, or conflicts in your family. Long-term stress can harm your health.  How does stress affect your health?  When you are stressed, your body responds as though you are in danger. It makes hormones that speed up your heart, make you breathe faster, and give you a burst of energy. This is called the fight-or-flight stress response. If the stress is over quickly, your body goes back to normal and no harm is done.  But if stress happens too often or lasts too long, it can have bad effects. Long-term stress can make you more likely to get sick, and it can make symptoms of some diseases worse. If you tense up when you are stressed, you may develop neck, shoulder, or low back pain. Stress is linked to high blood pressure and  heart disease.  Stress also harms your emotional health. It can make you mtz, tense, or depressed. Your relationships may suffer, and you may not do well at work or school.  What can you do to manage stress?  You can try these things to help manage stress:   Do something active. Exercise or activity can help reduce stress. Walking is a great way to get started. Even everyday activities such as housecleaning or yard work can help.  Try yoga or marc chi. These techniques combine exercise and meditation. You may need some training at first to learn them.  Do something you enjoy. For example, listen to music or go to a movie. Practice your hobby or do volunteer work.  Meditate. This can help you relax, because you are not worrying about what happened before or what may happen in the future.  Do guided imagery. Imagine yourself in any setting that helps you feel calm. You can use online videos, books, or a teacher to guide you.  Do breathing exercises. For example:  From a standing position, bend forward from the waist with your knees slightly bent. Let your arms dangle close to the floor.  Breathe in slowly and deeply as you return to a standing position. Roll up slowly and lift your head last.  Hold your breath for just a few seconds in the standing position.  Breathe out slowly and bend forward from the waist.  Let your feelings out. Talk, laugh, cry, and express anger when you need to. Talking with supportive friends or family, a counselor, or a treasure leader about your feelings is a healthy way to relieve stress. Avoid discussing your feelings with people who make you feel worse.  Write. It may help to write about things that are bothering you. This helps you find out how much stress you feel and what is causing it. When you know this, you can find better ways to cope.  What can you do to prevent stress?  You might try some of these things to help prevent stress:  Manage your time. This helps you find time to do the  "things you want and need to do.  Get enough sleep. Your body recovers from the stresses of the day while you are sleeping.  Get support. Your family, friends, and community can make a difference in how you experience stress.  Limit your news feed. Avoid or limit time on social media or news that may make you feel stressed.  Do something active. Exercise or activity can help reduce stress. Walking is a great way to get started.  Where can you learn more?  Go to https://www.[employer].net/patiented  Enter N032 in the search box to learn more about \"Learning About Stress.\"  Current as of: October 24, 2023               Content Version: 14.0    6504-1609 [employer].   Care instructions adapted under license by your healthcare professional. If you have questions about a medical condition or this instruction, always ask your healthcare professional. [employer] disclaims any warranty or liability for your use of this information.      Learning About Sleeping Well  What does sleeping well mean?     Sleeping well means getting enough sleep to feel good and stay healthy. How much sleep is enough varies among people.  The number of hours you sleep and how you feel when you wake up are both important. If you do not feel refreshed, you probably need more sleep. Another sign of not getting enough sleep is feeling tired during the day.  Experts recommend that adults get at least 7 or more hours of sleep per day. Children and older adults need more sleep.  Why is getting enough sleep important?  Getting enough quality sleep is a basic part of good health. When your sleep suffers, your physical health, mood, and your thoughts can suffer too. You may find yourself feeling more grumpy or stressed. Not getting enough sleep also can lead to serious problems, including injury, accidents, anxiety, and depression.  What might cause poor sleeping?  Many things can cause sleep problems, including:  Changes to your " "sleep schedule.  Stress. Stress can be caused by fear about a single event, such as giving a speech. Or you may have ongoing stress, such as worry about work or school.  Depression, anxiety, and other mental or emotional conditions.  Changes in your sleep habits or surroundings. This includes changes that happen where you sleep, such as noise, light, or sleeping in a different bed. It also includes changes in your sleep pattern, such as having jet lag or working a late shift.  Health problems, such as pain, breathing problems, and restless legs syndrome.  Lack of regular exercise.  Using alcohol, nicotine, or caffeine before bed.  How can you help yourself?  Here are some tips that may help you sleep more soundly and wake up feeling more refreshed.  Your sleeping area   Use your bedroom only for sleeping and sex. A bit of light reading may help you fall asleep. But if it doesn't, do your reading elsewhere in the house. Try not to use your TV, computer, smartphone, or tablet while you are in bed.  Be sure your bed is big enough to stretch out comfortably, especially if you have a sleep partner.  Keep your bedroom quiet, dark, and cool. Use curtains, blinds, or a sleep mask to block out light. To block out noise, use earplugs, soothing music, or a \"white noise\" machine.  Your evening and bedtime routine   Create a relaxing bedtime routine. You might want to take a warm shower or bath, or listen to soothing music.  Go to bed at the same time every night. And get up at the same time every morning, even if you feel tired.  What to avoid   Limit caffeine (coffee, tea, caffeinated sodas) during the day, and don't have any for at least 6 hours before bedtime.  Avoid drinking alcohol before bedtime. Alcohol can cause you to wake up more often during the night.  Try not to smoke or use tobacco, especially in the evening. Nicotine can keep you awake.  Limit naps during the day, especially close to bedtime.  Avoid lying in bed " "awake for too long. If you can't fall asleep or if you wake up in the middle of the night and can't get back to sleep within about 20 minutes, get out of bed and go to another room until you feel sleepy.  Avoid taking medicine right before bed that may keep you awake or make you feel hyper or energized. Your doctor can tell you if your medicine may do this and if you can take it earlier in the day.  If you can't sleep   Imagine yourself in a peaceful, pleasant scene. Focus on the details and feelings of being in a place that is relaxing.  Get up and do a quiet or boring activity until you feel sleepy.  Avoid drinking any liquids before going to bed to help prevent waking up often to use the bathroom.  Where can you learn more?  Go to https://www.[employer]age.net/patiented  Enter J942 in the search box to learn more about \"Learning About Sleeping Well.\"  Current as of: July 10, 2023               Content Version: 14.0    1454-2115 [employer].   Care instructions adapted under license by your healthcare professional. If you have questions about a medical condition or this instruction, always ask your healthcare professional. [employer] disclaims any warranty or liability for your use of this information.      Bladder Training: Care Instructions  Your Care Instructions     Bladder training is used to treat urge incontinence and stress incontinence. Urge incontinence means that the need to urinate comes on so fast that you can't get to a toilet in time. Stress incontinence means that you leak urine because of pressure on your bladder. For example, it may happen when you laugh, cough, or lift something heavy.  Bladder training can increase how long you can wait before you have to urinate. It can also help your bladder hold more urine. And it can give you better control over the urge to urinate.  It is important to remember that bladder training takes a few weeks to a few months to make a " difference. You may not see results right away, but don't give up.  Follow-up care is a key part of your treatment and safety. Be sure to make and go to all appointments, and call your doctor if you are having problems. It's also a good idea to know your test results and keep a list of the medicines you take.  How can you care for yourself at home?  Work with your doctor to come up with a bladder training program that is right for you. You may use one or more of the following methods.  Delayed urination  In the beginning, try to keep from urinating for 5 minutes after you first feel the need to go.  While you wait, take deep, slow breaths to relax. Kegel exercises can also help you delay the need to go to the bathroom.  After some practice, when you can easily wait 5 minutes to urinate, try to wait 10 minutes before you urinate.  Slowly increase the waiting period until you are able to control when you have to urinate.  Scheduled urination  Empty your bladder when you first wake up in the morning.  Schedule times throughout the day when you will urinate.  Start by going to the bathroom every hour, even if you don't need to go.  Slowly increase the time between trips to the bathroom.  When you have found a schedule that works well for you, keep doing it.  If you wake up during the night and have to urinate, do it. Apply your schedule to waking hours only.  Kegel exercises  These tighten and strengthen pelvic muscles, which can help you control the flow of urine. (If doing these exercises causes pain, stop doing them and talk with your doctor.) To do Kegel exercises:  Squeeze your muscles as if you were trying not to pass gas. Or squeeze your muscles as if you were stopping the flow of urine. Your belly, legs, and buttocks shouldn't move.  Hold the squeeze for 3 seconds, then relax for 5 to 10 seconds.  Start with 3 seconds, then add 1 second each week until you are able to squeeze for 10 seconds.  Repeat the exercise  "10 times a session. Do 3 to 8 sessions a day.  When should you call for help?  Watch closely for changes in your health, and be sure to contact your doctor if:    Your incontinence is getting worse.     You do not get better as expected.   Where can you learn more?  Go to https://www.[employer].net/patiented  Enter V684 in the search box to learn more about \"Bladder Training: Care Instructions.\"  Current as of: November 15, 2023               Content Version: 14.0    8423-9303 [employer]   Care instructions adapted under license by your healthcare professional. If you have questions about a medical condition or this instruction, always ask your healthcare professional. [employer] disclaims any warranty or liability for your use of this information.      "

## 2024-05-16 LAB — NONINV COLON CA DNA+OCC BLD SCRN STL QL: NEGATIVE

## 2024-06-17 PROBLEM — Z71.89 ADVANCED DIRECTIVES, COUNSELING/DISCUSSION: Status: RESOLVED | Noted: 2017-07-21 | Resolved: 2024-06-17

## 2024-11-06 ENCOUNTER — PATIENT OUTREACH (OUTPATIENT)
Dept: CARE COORDINATION | Facility: CLINIC | Age: 69
End: 2024-11-06
Payer: COMMERCIAL

## 2024-12-04 ENCOUNTER — PATIENT OUTREACH (OUTPATIENT)
Dept: CARE COORDINATION | Facility: CLINIC | Age: 69
End: 2024-12-04
Payer: COMMERCIAL

## 2025-01-07 ENCOUNTER — HOSPITAL ENCOUNTER (OUTPATIENT)
Dept: MAMMOGRAPHY | Facility: CLINIC | Age: 70
Discharge: HOME | End: 2025-01-07
Attending: FAMILY MEDICINE
Payer: COMMERCIAL

## 2025-01-07 DIAGNOSIS — Z12.31 VISIT FOR SCREENING MAMMOGRAM: ICD-10-CM

## 2025-01-07 PROCEDURE — 77063 BREAST TOMOSYNTHESIS BI: CPT

## 2025-02-19 ENCOUNTER — OFFICE VISIT (OUTPATIENT)
Dept: OPTOMETRY | Facility: CLINIC | Age: 70
End: 2025-02-19
Payer: COMMERCIAL

## 2025-02-19 DIAGNOSIS — H25.13 NUCLEAR SCLEROTIC CATARACT OF BOTH EYES: ICD-10-CM

## 2025-02-19 DIAGNOSIS — Z01.01 VISION EXAM WITH ABNORMAL FINDINGS: Primary | ICD-10-CM

## 2025-02-19 DIAGNOSIS — H52.4 PRESBYOPIA: ICD-10-CM

## 2025-02-19 PROCEDURE — 92015 DETERMINE REFRACTIVE STATE: CPT | Performed by: OPTOMETRIST

## 2025-02-19 PROCEDURE — 92014 COMPRE OPH EXAM EST PT 1/>: CPT | Performed by: OPTOMETRIST

## 2025-02-19 ASSESSMENT — REFRACTION_WEARINGRX
OD_AXIS: 155
SPECS_TYPE: SVL
OD_CYLINDER: +0.50
OS_SPHERE: +2.50
OS_CYLINDER: SPHERE
OD_SPHERE: +2.25
SPECS_TYPE: OTC'S
OS_SPHERE: +2.25
OD_SPHERE: +2.00

## 2025-02-19 ASSESSMENT — VISUAL ACUITY
OD_SC: 20/100
OS_CC: 20/20
OD_CC: 20/25
OS_CC: 20/25-1
METHOD: SNELLEN - LINEAR
OS_SC: 20/70-1
METHOD: SNELLEN - LINEAR
OS_SC+: -3
OD_CC: 20/20-1
OD_SC: 20/25
OD_SC+: -3
CORRECTION_TYPE: GLASSES
OS_SC: 20/25

## 2025-02-19 ASSESSMENT — REFRACTION_MANIFEST
OD_SPHERE: +0.25
METHOD_AUTOREFRACTION: 1
OD_AXIS: 168
OD_CYLINDER: +0.50
OD_AXIS: 165
OS_SPHERE: +0.50
OD_CYLINDER: +0.75
OD_SPHERE: +0.25
OS_SPHERE: +0.25
OS_AXIS: 025
OS_ADD: +2.00
OS_AXIS: 020
OS_CYLINDER: +0.50
OS_CYLINDER: +0.50
OD_ADD: +2.00

## 2025-02-19 ASSESSMENT — CONF VISUAL FIELD
OD_INFERIOR_TEMPORAL_RESTRICTION: 0
OS_NORMAL: 1
OD_SUPERIOR_TEMPORAL_RESTRICTION: 0
OD_NORMAL: 1
METHOD: COUNTING FINGERS
OS_SUPERIOR_TEMPORAL_RESTRICTION: 0
OD_INFERIOR_NASAL_RESTRICTION: 0
OD_SUPERIOR_NASAL_RESTRICTION: 0
OS_INFERIOR_NASAL_RESTRICTION: 0
OS_INFERIOR_TEMPORAL_RESTRICTION: 0
OS_SUPERIOR_NASAL_RESTRICTION: 0

## 2025-02-19 ASSESSMENT — TONOMETRY
OS_IOP_MMHG: 13
IOP_METHOD: APPLANATION
OD_IOP_MMHG: 13

## 2025-02-19 ASSESSMENT — KERATOMETRY
OS_K2POWER_DIOPTERS: 45.00
OD_K2POWER_DIOPTERS: 44.00
OS_AXISANGLE2_DEGREES: 20
OD_K1POWER_DIOPTERS: 44.50
OS_K1POWER_DIOPTERS: 45.25
OD_AXISANGLE2_DEGREES: 180

## 2025-02-19 ASSESSMENT — EXTERNAL EXAM - LEFT EYE: OS_EXAM: NORMAL

## 2025-02-19 ASSESSMENT — SLIT LAMP EXAM - LIDS
COMMENTS: NORMAL
COMMENTS: NORMAL

## 2025-02-19 ASSESSMENT — EXTERNAL EXAM - RIGHT EYE: OD_EXAM: NORMAL

## 2025-02-19 ASSESSMENT — CUP TO DISC RATIO
OD_RATIO: 0.2
OS_RATIO: 0.2

## 2025-02-19 NOTE — LETTER
2/19/2025      Lina Sanford  [address]  [place] [address]      Dear Colleague,    Thank you for referring your patient, Lina Sanford, to the [facility]. Please see a copy of my visit note below.    Chief Complaint   Patient presents with     COMPREHENSIVE EYE EXAM         Last Eye Exam: 11/22/11  Dilated Previously: Yes    What are you currently using to see?  Glasses for Near, also uses OTC readers, uses rom +1.75's up to about +2.25's        Distance Vision Acuity: Satisfied with vision, no changes or issues     Near Vision Acuity: Satisfied with vision while reading and using computer with readers and with glasses. Uses Rx glasses mostly at hime, she doesn't want to lose them     Eye Comfort: good and dry at times   Do you use eye drops? : Yes: Has recently started using Systane   Occupation or Hobbies: Retired, Sews, Knitting     Amirah Apple Optometric Assistant           Medical, surgical and family histories reviewed and updated 2/19/2025.       OBJECTIVE: See Ophthalmology exam    ASSESSMENT:    ICD-10-CM    1. Vision exam with abnormal findings  Z01.01       2. Presbyopia  H52.4       3. Nuclear sclerotic cataract of both eyes- mild  H25.13           PLAN:     Patient Instructions   Fill glasses prescription as needed  Allow 2 weeks to adapt to change in glasses  Return in 1 year for eye exam    Krystal Bone O.D.   Electronically Signed    [facility]  [address], [place] [id number] [phone]      Again, thank you for allowing me to participate in the care of your patient.        Sincerely,        Krystal Bone OD    Electronically signed

## 2025-02-19 NOTE — PATIENT INSTRUCTIONS
Fill glasses prescription as needed  Allow 2 weeks to adapt to change in glasses  Return in 1 year for eye exam    Krystal Bone O.D.   Electronically Signed    [facility]  [address] [place] [address] [phone]

## 2025-05-06 ENCOUNTER — TELEPHONE (OUTPATIENT)
Dept: FAMILY MEDICINE | Facility: CLINIC | Age: 70
End: 2025-05-06
Payer: COMMERCIAL

## 2025-05-06 NOTE — TELEPHONE ENCOUNTER
Patient Quality Outreach    Patient is due for the following:   Physical Annual Wellness Visit      Topic Date Due    COVID-19 Vaccine (8 - 2024-25 season) 03/11/2025       Action(s) Taken:   Patient has upcoming appointment, these items will be addressed at that time.    Type of outreach:    Chart review performed, no outreach needed.    Questions for provider review:    None         Ana Clinton CMA  Chart routed to None.

## 2025-05-08 ENCOUNTER — OFFICE VISIT (OUTPATIENT)
Dept: FAMILY MEDICINE | Facility: CLINIC | Age: 70
End: 2025-05-08
Attending: FAMILY MEDICINE
Payer: COMMERCIAL

## 2025-05-08 VITALS
DIASTOLIC BLOOD PRESSURE: 86 MMHG | RESPIRATION RATE: 16 BRPM | SYSTOLIC BLOOD PRESSURE: 138 MMHG | HEART RATE: 70 BPM | WEIGHT: 122.4 LBS | TEMPERATURE: 98.7 F | HEIGHT: 64 IN | BODY MASS INDEX: 20.9 KG/M2 | OXYGEN SATURATION: 99 %

## 2025-05-08 DIAGNOSIS — R73.09 ABNORMAL GLUCOSE: ICD-10-CM

## 2025-05-08 DIAGNOSIS — Z78.0 ASYMPTOMATIC POSTMENOPAUSAL STATUS: ICD-10-CM

## 2025-05-08 DIAGNOSIS — M25.562 CHRONIC PAIN OF LEFT KNEE: ICD-10-CM

## 2025-05-08 DIAGNOSIS — M79.672 LEFT FOOT PAIN: ICD-10-CM

## 2025-05-08 DIAGNOSIS — G89.29 CHRONIC PAIN OF LEFT KNEE: ICD-10-CM

## 2025-05-08 DIAGNOSIS — Z12.83 SCREENING FOR SKIN CANCER: ICD-10-CM

## 2025-05-08 DIAGNOSIS — E53.8 VITAMIN B12 DEFICIENCY (NON ANEMIC): ICD-10-CM

## 2025-05-08 DIAGNOSIS — Z00.00 ENCOUNTER FOR MEDICARE ANNUAL WELLNESS EXAM: Primary | ICD-10-CM

## 2025-05-08 DIAGNOSIS — Z13.6 CARDIOVASCULAR SCREENING; LDL GOAL LESS THAN 160: ICD-10-CM

## 2025-05-08 LAB
ALBUMIN SERPL BCG-MCNC: 4.4 G/DL (ref 3.5–5.2)
ALP SERPL-CCNC: 121 U/L (ref 40–150)
ALT SERPL W P-5'-P-CCNC: 11 U/L (ref 0–50)
ANION GAP SERPL CALCULATED.3IONS-SCNC: 12 MMOL/L (ref 7–15)
AST SERPL W P-5'-P-CCNC: 23 U/L (ref 0–45)
BILIRUB SERPL-MCNC: 0.7 MG/DL
BUN SERPL-MCNC: 8.3 MG/DL (ref 8–23)
CALCIUM SERPL-MCNC: 9.8 MG/DL (ref 8.8–10.4)
CHLORIDE SERPL-SCNC: 101 MMOL/L (ref 98–107)
CHOLEST SERPL-MCNC: 219 MG/DL
CREAT SERPL-MCNC: 0.78 MG/DL (ref 0.51–0.95)
EGFRCR SERPLBLD CKD-EPI 2021: 82 ML/MIN/1.73M2
EST. AVERAGE GLUCOSE BLD GHB EST-MCNC: 105 MG/DL
FASTING STATUS PATIENT QL REPORTED: YES
FASTING STATUS PATIENT QL REPORTED: YES
GLUCOSE SERPL-MCNC: 95 MG/DL (ref 70–99)
HBA1C MFR BLD: 5.3 % (ref 0–5.6)
HCO3 SERPL-SCNC: 27 MMOL/L (ref 22–29)
HDLC SERPL-MCNC: 83 MG/DL
LDLC SERPL CALC-MCNC: 121 MG/DL
NONHDLC SERPL-MCNC: 136 MG/DL
POTASSIUM SERPL-SCNC: 4.7 MMOL/L (ref 3.4–5.3)
PROT SERPL-MCNC: 7.2 G/DL (ref 6.4–8.3)
SODIUM SERPL-SCNC: 140 MMOL/L (ref 135–145)
TRIGL SERPL-MCNC: 77 MG/DL
VIT B12 SERPL-MCNC: 445 PG/ML (ref 232–1245)

## 2025-05-08 SDOH — HEALTH STABILITY: PHYSICAL HEALTH: ON AVERAGE, HOW MANY DAYS PER WEEK DO YOU ENGAGE IN MODERATE TO STRENUOUS EXERCISE (LIKE A BRISK WALK)?: 5 DAYS

## 2025-05-08 ASSESSMENT — SOCIAL DETERMINANTS OF HEALTH (SDOH): HOW OFTEN DO YOU GET TOGETHER WITH FRIENDS OR RELATIVES?: THREE TIMES A WEEK

## 2025-05-08 NOTE — PATIENT INSTRUCTIONS
Patient Education   Preventive Care Advice   This is general advice given by our system to help you stay healthy. However, your care team may have specific advice just for you. Please talk to your care team about your preventive care needs.  Nutrition  Eat 5 or more servings of fruits and vegetables each day.  Try wheat bread, brown rice and whole grain pasta (instead of white bread, rice, and pasta).  Get enough calcium and vitamin D. Check the label on foods and aim for 100% of the RDA (recommended daily allowance).  Lifestyle  Exercise at least 150 minutes each week  (30 minutes a day, 5 days a week).  Do muscle strengthening activities 2 days a week. These help control your weight and prevent disease.  No smoking.  Wear sunscreen to prevent skin cancer.  Have a dental exam and cleaning every 6 months.  Yearly exams  See your health care team every year to talk about:  Any changes in your health.  Any medicines your care team has prescribed.  Preventive care, family planning, and ways to prevent chronic diseases.  Shots (vaccines)   HPV shots (up to age 26), if you've never had them before.  Hepatitis B shots (up to age 59), if you've never had them before.  COVID-19 shot: Get this shot when it's due.  Flu shot: Get a flu shot every year.  Tetanus shot: Get a tetanus shot every 10 years.  Pneumococcal, hepatitis A, and RSV shots: Ask your care team if you need these based on your risk.  Shingles shot (for age 50 and up)  General health tests  Diabetes screening:  Starting at age 35, Get screened for diabetes at least every 3 years.  If you are younger than age 35, ask your care team if you should be screened for diabetes.  Cholesterol test: At age 39, start having a cholesterol test every 5 years, or more often if advised.  Bone density scan (DEXA): At age 50, ask your care team if you should have this scan for osteoporosis (brittle bones).  Hepatitis C: Get tested at least once in your life.  STIs (sexually  transmitted infections)  Before age 24: Ask your care team if you should be screened for STIs.  After age 24: Get screened for STIs if you're at risk. You are at risk for STIs (including HIV) if:  You are sexually active with more than one person.  You don't use condoms every time.  You or a partner was diagnosed with a sexually transmitted infection.  If you are at risk for HIV, ask about PrEP medicine to prevent HIV.  Get tested for HIV at least once in your life, whether you are at risk for HIV or not.  Cancer screening tests  Cervical cancer screening: If you have a cervix, begin getting regular cervical cancer screening tests starting at age 21.  Breast cancer scan (mammogram): If you've ever had breasts, begin having regular mammograms starting at age 40. This is a scan to check for breast cancer.  Colon cancer screening: It is important to start screening for colon cancer at age 45.  Have a colonoscopy test every 10 years (or more often if you're at risk) Or, ask your provider about stool tests like a FIT test every year or Cologuard test every 3 years.  To learn more about your testing options, visit:   .  For help making a decision, visit:   https://bit.ly/bt78674.  Prostate cancer screening test: If you have a prostate, ask your care team if a prostate cancer screening test (PSA) at age 55 is right for you.  Lung cancer screening: If you are a current or former smoker ages 50 to 80, ask your care team if ongoing lung cancer screenings are right for you.  For informational purposes only. Not to replace the advice of your health care provider. Copyright   2023 K[employer]. All rights reserved. Clinically reviewed by the [facility] Transitions Program. [employer] [id number] - REV 01/24.

## 2025-05-08 NOTE — PROGRESS NOTES
Preventive Care Visit  [facility] TELMA Patel DO, Family Medicine  May 8, 2025      Assessment & Plan       ICD-10-CM    1. Encounter for Medicare annual wellness exam  Z00.00       2. Chronic pain of left knee  M25.562 Orthopedic  Referral    G89.29       3. Left foot pain  M79.672 Orthopedic  Referral      4. Vitamin B12 deficiency (non anemic)  E53.8 Vitamin B12     Vitamin B12      5. Asymptomatic postmenopausal status  Z78.0 DEXA HIP/PELVIS/SPINE - Future      6. Screening for skin cancer  Z12.83 Adult Dermatology  Referral      7. Abnormal glucose  R73.09 Comprehensive metabolic panel (BMP + Alb, Alk Phos, ALT, AST, Total. Bili, TP)     Hemoglobin A1c     Comprehensive metabolic panel (BMP + Alb, Alk Phos, ALT, AST, Total. Bili, TP)     Hemoglobin A1c      8. CARDIOVASCULAR SCREENING; LDL GOAL LESS THAN 160  Z13.6 Lipid panel reflex to direct LDL Fasting     Comprehensive metabolic panel (BMP + Alb, Alk Phos, ALT, AST, Total. Bili, TP)     Lipid panel reflex to direct LDL Fasting     Comprehensive metabolic panel (BMP + Alb, Alk Phos, ALT, AST, Total. Bili, TP)        Chronic foot and knee pain following injuries:  previously managed by sports med, new referral placed.    B12 deficiency:  pt has not been supplementing, recheck        Patient has been advised of split billing requirements and indicates understanding: Yes        Counseling  Appropriate preventive services were addressed with this patient via screening, questionnaire, or discussion as appropriate for fall prevention, nutrition, physical activity, Tobacco-use cessation, social engagement, weight loss and cognition.  Checklist reviewing preventive services available has been given to the patient.  Reviewed patient's diet, addressing concerns and/or questions.           Yary Leonard is a 69 year old, presenting for the following:  Physical        5/8/2025    10:16 AM   Additional Questions    Roomed by Ana COPELAND   Accompanied by Self            HPI  Concerns:  Left knee pain  Left foot pain   Rash on arms and legs after being in the sun - sun rash for at least 10 years.  Does better if she uses a lot of sunscreen.  Polymorphic light eruption.         Advance Care Planning    Discussed advance care planning with patient; informed AVS has link to [employer].        5/8/2025   General Health   How would you rate your overall physical health? Good   Feel stress (tense, anxious, or unable to sleep) Only a little   (!) STRESS CONCERN      5/8/2025   Nutrition   Diet: Regular (no restrictions)         5/8/2025   Exercise   Days per week of moderate/strenous exercise 5 days         5/8/2025   Social Factors   Frequency of gathering with friends or relatives Three times a week   Worry food won't last until get money to buy more No   Food not last or not have enough money for food? No   Do you have housing? (Housing is defined as stable permanent housing and does not include staying outside in a car, in a tent, in an abandoned building, in an overnight shelter, or couch-surfing.) Yes   Are you worried about losing your housing? No   Lack of transportation? No   Unable to get utilities (heat,electricity)? No         5/8/2025   Fall Risk   Fallen 2 or more times in the past year? No    No   Trouble with walking or balance? No    No       Multiple values from one day are sorted in reverse-chronological order          5/8/2025   Activities of Daily Living- Home Safety   Needs help with the following daily activites None of the above   Safety concerns in the home None of the above         5/8/2025   Dental   Dentist two times every year? Yes         5/8/2025   Hearing Screening   Hearing concerns? None of the above         5/8/2025   Driving Risk Screening   Patient/family members have concerns about driving No         5/8/2025   General Alertness/Fatigue Screening   Have you been more tired than usual lately? No          5/8/2025   Urinary Incontinence Screening   Bothered by leaking urine in past 6 months No         Today's PHQ-2 Score:       5/8/2025    10:09 AM   PHQ-2 ( 1999 [employer])   Q1: Little interest or pleasure in doing things 0   Q2: Feeling down, depressed or hopeless 0   PHQ-2 Score 0    Q1: Little interest or pleasure in doing things Not at all   Q2: Feeling down, depressed or hopeless Not at all   PHQ-2 Score 0       Patient-reported           5/8/2025   Substance Use   Alcohol more than 3/day or more than 7/wk No   Do you have a current opioid prescription? No   How severe/bad is pain from 1 to 10? 1/10   Do you use any other substances recreationally? No     Social History     Tobacco Use    Smoking status: Never    Smokeless tobacco: Never   Vaping Use    Vaping status: Never Used   Substance Use Topics    Alcohol use: Yes     Comment: occasionally approx 2 drinks per week    Drug use: No           1/7/2025   LAST FHS-7 RESULTS   1st degree relative breast or ovarian cancer No   Any relative bilateral breast cancer No   Any male have breast cancer No   Any ONE woman have BOTH breast AND ovarian cancer No   Any woman with breast cancer before 50yrs No   2 or more relatives with breast AND/OR ovarian cancer No   2 or more relatives with breast AND/OR bowel cancer No        Mammogram Screening - Mammogram every 1-2 years updated in Health Maintenance based on mutual decision making      History of abnormal Pap smear: No - age 65 or older with adequate negative prior screening test results (3 consecutive negative cytology results, 2 consecutive negative cotesting results, or 2 consecutive negative HrHPV test results within 10 years, with the most recent test occurring within the recommended screening interval for the test used)        Latest Ref Rng & Units 8/4/2017     2:03 PM 8/4/2017     2:00 PM 11/22/2013    12:00 AM   PAP / HPV   PAP (Historical)  NIL   NIL    HPV 16 DNA NEG  Negative     HPV 18 DNA NEG   Negative     Other HR HPV NEG  Negative       ASCVD Risk   The 10-year ASCVD risk score (Jake AVENDAÑO, et al., 2019) is: 8.2%    Values used to calculate the score:      Age: 69 years      Sex: Female      Is Non- : No      Diabetic: No      Tobacco smoker: No      Systolic Blood Pressure: 138 mmHg      Is BP treated: No      HDL Cholesterol: 89 mg/dL      Total Cholesterol: 236 mg/dL            Reviewed and updated as needed this visit by Provider   Tobacco  Allergies  Meds                Past Medical History:   Diagnosis Date    Arthritis 2016    Index finger on right hand - also some other fongers    Right-sided Bell's palsy 2018    unable to keep lids closed at night     Past Surgical History:   Procedure Laterality Date    BIOPSY  1978    COLONOSCOPY  01/09/2012    Procedure:COLONOSCOPY; colonoscopy; Surgeon:ANDRIY PETERSON; Location:[facility]    LUMPECTOMY BREAST      SURGICAL HISTORY OF -   01/01/1975    surgical repair of dislocated R middle finger    ZZC APPENDECTOMY  01/01/1963     Current providers sharing in care for this patient include:  Patient Care Team:  Amanda Patel DO as PCP - General  Amanda Patel DO as Assigned PCP  Krystal Bone OD as Assigned Surgical Provider    The following health maintenance items are reviewed in Epic and correct as of today:  Health Maintenance   Topic Date Due    ANNUAL REVIEW OF HM ORDERS  Never done    DEXA  12/10/2024    COVID-19 Vaccine (8 - 2024-25 season) 03/11/2025    MEDICARE ANNUAL WELLNESS VISIT  05/08/2026    FALL RISK ASSESSMENT  05/08/2026    MAMMO SCREENING  01/07/2027    DIABETES SCREENING  04/26/2027    COLORECTAL CANCER SCREENING  05/09/2027    LIPID  04/26/2029    ADVANCE CARE PLANNING  05/08/2030    DTAP/TDAP/TD IMMUNIZATION (3 - Td or Tdap) 08/28/2034    HEPATITIS C SCREENING  Completed    PHQ-2 (once per calendar year)  Completed    INFLUENZA VACCINE  Completed    Pneumococcal Vaccine: 50+ Years   "Completed    ZOSTER IMMUNIZATION  Completed    RSV VACCINE  Completed    HPV IMMUNIZATION  Aged Out    MENINGITIS IMMUNIZATION  Aged Out    PAP  Discontinued         Review of Systems  Constitutional, HEENT, cardiovascular, pulmonary, gi and gu systems are negative, except as otherwise noted.     Objective    Exam  /86   Pulse 70   Temp 98.7  F (37.1  C) (Tympanic)   Resp 16   Ht 1.626 m (5' 4\")   Wt 55.5 kg (122 lb 6.4 oz)   LMP 06/12/2007   SpO2 99%   BMI 21.01 kg/m     Estimated body mass index is 21.01 kg/m  as calculated from the following:    Height as of this encounter: 1.626 m (5' 4\").    Weight as of this encounter: 55.5 kg (122 lb 6.4 oz).    Physical Exam  GENERAL: alert and no distress  EYES: Eyes grossly normal to inspection, PERRL and conjunctivae and sclerae normal  NECK: no adenopathy, no asymmetry, masses, or scars  RESP: lungs clear to auscultation - no rales, rhonchi or wheezes  CV: regular rate and rhythm, normal S1 S2, no S3 or S4, no murmur, click or rub, no peripheral edema  ABDOMEN: soft, nontender, no hepatosplenomegaly, no masses and bowel sounds normal  MS: no gross musculoskeletal defects noted, no edema  NEURO: Normal strength and tone, mentation intact and speech normal  PSYCH: mentation appears normal, affect normal/bright         5/8/2025   Mini Cog   Clock Draw Score 2 Normal   3 Item Recall 3 objects recalled   Mini Cog Total Score 5              Signed Electronically by: Amanda Patel DO    "

## 2025-05-12 ENCOUNTER — PATIENT OUTREACH (OUTPATIENT)
Dept: CARE COORDINATION | Facility: CLINIC | Age: 70
End: 2025-05-12
Payer: COMMERCIAL

## 2025-05-12 ENCOUNTER — RESULTS FOLLOW-UP (OUTPATIENT)
Dept: FAMILY MEDICINE | Facility: CLINIC | Age: 70
End: 2025-05-12
Payer: COMMERCIAL

## 2025-05-12 DIAGNOSIS — E78.5 HYPERLIPIDEMIA LDL GOAL <100: Primary | ICD-10-CM

## 2025-06-06 ENCOUNTER — HOSPITAL ENCOUNTER (OUTPATIENT)
Dept: BONE DENSITY | Facility: CLINIC | Age: 70
Discharge: HOME | End: 2025-06-06
Attending: FAMILY MEDICINE | Admitting: FAMILY MEDICINE
Payer: COMMERCIAL

## 2025-06-06 DIAGNOSIS — Z78.0 ASYMPTOMATIC POSTMENOPAUSAL STATUS: ICD-10-CM

## 2025-06-06 PROCEDURE — 77080 DXA BONE DENSITY AXIAL: CPT

## 2025-08-14 ENCOUNTER — OFFICE VISIT (OUTPATIENT)
Dept: DERMATOLOGY | Facility: CLINIC | Age: 70
End: 2025-08-14
Attending: FAMILY MEDICINE
Payer: COMMERCIAL

## 2025-08-14 DIAGNOSIS — D22.9 MULTIPLE BENIGN NEVI: Primary | ICD-10-CM

## 2025-08-14 DIAGNOSIS — L82.1 SEBORRHEIC KERATOSES: ICD-10-CM

## 2025-08-14 DIAGNOSIS — Z12.83 SKIN CANCER SCREENING: ICD-10-CM

## 2025-08-14 DIAGNOSIS — D18.01 CHERRY ANGIOMA: ICD-10-CM

## 2025-09-04 ENCOUNTER — LAB (OUTPATIENT)
Dept: LAB | Facility: CLINIC | Age: 70
End: 2025-09-04
Payer: COMMERCIAL

## 2025-09-04 DIAGNOSIS — E78.5 HYPERLIPIDEMIA LDL GOAL <100: ICD-10-CM

## 2025-09-04 LAB
CHOLEST SERPL-MCNC: 164 MG/DL
FASTING STATUS PATIENT QL REPORTED: YES
HDLC SERPL-MCNC: 89 MG/DL
LDLC SERPL CALC-MCNC: 58 MG/DL
NONHDLC SERPL-MCNC: 75 MG/DL
TRIGL SERPL-MCNC: 84 MG/DL